# Patient Record
Sex: FEMALE | Race: BLACK OR AFRICAN AMERICAN | Employment: OTHER | URBAN - METROPOLITAN AREA
[De-identification: names, ages, dates, MRNs, and addresses within clinical notes are randomized per-mention and may not be internally consistent; named-entity substitution may affect disease eponyms.]

---

## 2017-01-17 ENCOUNTER — TELEPHONE (OUTPATIENT)
Dept: INTERNAL MEDICINE CLINIC | Facility: CLINIC | Age: 22
End: 2017-01-17

## 2017-01-17 PROBLEM — F84.0 AUTISM (HCC): Status: ACTIVE | Noted: 2017-01-17

## 2017-01-17 PROBLEM — F84.0 AUTISM: Status: ACTIVE | Noted: 2017-01-17

## 2017-01-17 PROBLEM — F90.9 ADHD (ATTENTION DEFICIT HYPERACTIVITY DISORDER): Status: ACTIVE | Noted: 2017-01-17

## 2017-01-17 PROBLEM — F31.9 BIPOLAR 1 DISORDER (HCC): Status: ACTIVE | Noted: 2017-01-17

## 2017-01-17 NOTE — TELEPHONE ENCOUNTER
Signed medical release forms mailed:    [address]   [facility] [address]  Ph# [phone]    Dr [id number]  [place] [id number]   Ph# [phone]

## 2017-01-17 NOTE — PATIENT INSTRUCTIONS
Understanding Bipolar Disorder  Bipolar disorder is a serious disorder of the brain. It may severely disrupt your life. At times, it may cause you and your loved ones great pain. But there is hope.  Although there is no cure, treatment can help control yo In depressive episodes, you feel intense sadness and depression. You may also feel worthless, tired, and helpless. Even the things you value most don’t give you pleasure. At times you may want to die. You may even think about taking your own life.   Date [place] ADHD begins in childhood. Sometimes, your symptoms may improve as you get older. But they also may persist into your adult years. ADHD is often thought of as a “kid’s problem.” That’s why it’s often missed in adults.  In fact, many parents learn they have A

## 2017-01-17 NOTE — PROGRESS NOTES
HPI:    Patient ID: Rach Angela is a 19year old female. HPI She is here to establish care with new physician. She denies any specific complains . According to her mother she was seen by pediatrician until now at THE [facility] .She als HCl 1 MG Oral Tab Take 1 mg by mouth 2 (two) times daily. Disp:  Rfl:    Lithium Carbonate 150 MG Oral Cap Take 150 mg by mouth 2 (two) times daily. Disp:  Rfl:    Lisdexamfetamine Dimesylate (VYVANSE) 40 MG Oral Cap Take 40 mg by mouth every morning.  Disp present. No tracheal tenderness present. No tracheal deviation present. No thyroid mass and no thyromegaly present. Cardiovascular: Normal rate, regular rhythm, normal heart sounds and intact distal pulses. Exam reveals no gallop and no friction rub. Referrals:  None        MP#[id number]

## 2017-01-18 ENCOUNTER — TELEPHONE (OUTPATIENT)
Dept: INTERNAL MEDICINE CLINIC | Facility: CLINIC | Age: 22
End: 2017-01-18

## 2017-01-24 ENCOUNTER — NURSE ONLY (OUTPATIENT)
Dept: INTERNAL MEDICINE CLINIC | Facility: CLINIC | Age: 22
End: 2017-01-24

## 2017-01-24 DIAGNOSIS — E53.8 VITAMIN B12 DEFICIENCY: Primary | ICD-10-CM

## 2017-01-24 PROCEDURE — 96372 THER/PROPH/DIAG INJ SC/IM: CPT | Performed by: INTERNAL MEDICINE

## 2017-01-24 RX ORDER — CYANOCOBALAMIN 1000 UG/ML
1000 INJECTION INTRAMUSCULAR; SUBCUTANEOUS ONCE
Status: COMPLETED | OUTPATIENT
Start: 2017-01-24 | End: 2017-01-24

## 2017-01-24 RX ADMIN — CYANOCOBALAMIN 1000 MCG: 1000 INJECTION INTRAMUSCULAR; SUBCUTANEOUS at 12:28:00

## 2017-01-24 NOTE — PROGRESS NOTES
HPI:    Patient ID: Jonathan Espinosa is a 19year old female. HPI    Review of Systems         Current Outpatient Prescriptions:  ergocalciferol 28270 UNITS Oral Cap Take 1 capsule (50,000 Units total) by mouth once a week.  Disp: 12 capsule Rfl: 0   Gua

## 2017-02-02 ENCOUNTER — NURSE ONLY (OUTPATIENT)
Dept: INTERNAL MEDICINE CLINIC | Facility: CLINIC | Age: 22
End: 2017-02-02

## 2017-02-02 DIAGNOSIS — E53.8 VITAMIN B12 DEFICIENCY: Primary | ICD-10-CM

## 2017-02-02 PROCEDURE — 96372 THER/PROPH/DIAG INJ SC/IM: CPT | Performed by: INTERNAL MEDICINE

## 2017-02-02 RX ORDER — CYANOCOBALAMIN 1000 UG/ML
1000 INJECTION INTRAMUSCULAR; SUBCUTANEOUS ONCE
Status: COMPLETED | OUTPATIENT
Start: 2017-02-02 | End: 2017-02-02

## 2017-02-02 RX ADMIN — CYANOCOBALAMIN 1000 MCG: 1000 INJECTION INTRAMUSCULAR; SUBCUTANEOUS at 10:50:00

## 2017-02-02 NOTE — PROGRESS NOTES
Patient presented into clinic for therapuetic b12 injection. Verified patients identity with two identifiers, and verified b12 order. Administered injection into right deltoid.   Patient tolerated well

## 2017-02-16 ENCOUNTER — NURSE ONLY (OUTPATIENT)
Dept: INTERNAL MEDICINE CLINIC | Facility: CLINIC | Age: 22
End: 2017-02-16

## 2017-02-16 DIAGNOSIS — E53.8 B12 DEFICIENCY: Primary | ICD-10-CM

## 2017-02-16 PROCEDURE — 96372 THER/PROPH/DIAG INJ SC/IM: CPT | Performed by: INTERNAL MEDICINE

## 2017-02-16 RX ORDER — CYANOCOBALAMIN 1000 UG/ML
1000 INJECTION INTRAMUSCULAR; SUBCUTANEOUS ONCE
Status: COMPLETED | OUTPATIENT
Start: 2017-02-16 | End: 2017-02-16

## 2017-02-16 RX ADMIN — CYANOCOBALAMIN 1000 MCG: 1000 INJECTION INTRAMUSCULAR; SUBCUTANEOUS at 12:22:00

## 2017-02-16 NOTE — PROGRESS NOTES
Patient presents to the office for B12 injection. Order verified on lab result note from 1/17/2017. Patient tolerated vaccine well, no reactions. Informed patient next B12 injection will be in one week, verbalized understanding.

## 2017-02-21 ENCOUNTER — OFFICE VISIT (OUTPATIENT)
Dept: INTERNAL MEDICINE CLINIC | Facility: CLINIC | Age: 22
End: 2017-02-21

## 2017-02-21 VITALS
SYSTOLIC BLOOD PRESSURE: 98 MMHG | TEMPERATURE: 99 F | RESPIRATION RATE: 17 BRPM | HEIGHT: 67 IN | DIASTOLIC BLOOD PRESSURE: 63 MMHG | OXYGEN SATURATION: 97 % | BODY MASS INDEX: 21.97 KG/M2 | HEART RATE: 74 BPM | WEIGHT: 140 LBS

## 2017-02-21 DIAGNOSIS — E53.8 VITAMIN B12 DEFICIENCY: Primary | ICD-10-CM

## 2017-02-21 DIAGNOSIS — L30.9 ECZEMA, UNSPECIFIED TYPE: ICD-10-CM

## 2017-02-21 PROCEDURE — 96372 THER/PROPH/DIAG INJ SC/IM: CPT | Performed by: INTERNAL MEDICINE

## 2017-02-21 PROCEDURE — 99213 OFFICE O/P EST LOW 20 MIN: CPT | Performed by: INTERNAL MEDICINE

## 2017-02-21 RX ORDER — CYANOCOBALAMIN 1000 UG/ML
1000 INJECTION INTRAMUSCULAR; SUBCUTANEOUS ONCE
Status: COMPLETED | OUTPATIENT
Start: 2017-02-21 | End: 2017-02-21

## 2017-02-21 RX ADMIN — CYANOCOBALAMIN 1000 MCG: 1000 INJECTION INTRAMUSCULAR; SUBCUTANEOUS at 10:51:00

## 2017-02-21 NOTE — PATIENT INSTRUCTIONS
Atopic Dermatitis (Adult)  Atopic dermatitis is a dry, itchy, red rash. It’s also called eczema. The rash is chronic, or ongoing. It can come and go over time. The disease is often passed down in families.  It causes a problem with the skin barrier that m See your healthcare provider, or as advised. If your symptoms don’t get better or if they get worse in the next 7 days, make an appointment with your healthcare provider.   When to seek medical advice  Call your healthcare provider right away  if any of the · Use sunscreen whenever going out into direct sun. · Use only mild cleansing agents whenever possible. · Wash with mild, nonirritating soap and warm water. · Wear clothing that breathes, such as cotton shirts or canvas shoes.   · If fluid is seeping fro © 7961-4850 [address], [address] [place]. All rights reserved. This information is not intended as a substitute for professional medical care. Always follow your healthcare professional's instructions.

## 2017-02-21 NOTE — PROGRESS NOTES
HPI:    Patient ID: Geri Shaikh is a 19year old female. HPI she came in today complaining of a skin rash.   According to her she was having skin problems ongoing for some time, but for the last few weeks they got worse, she noticed skin discolorati UNITS Oral Cap Take 1 capsule (50,000 Units total) by mouth once a week. Disp: 12 capsule Rfl: 0   divalproex Sodium (DEPAKOTE) 500 MG Oral Tab EC Take 1,000 mg by mouth every morning.  Indications: MOOD Disp:  Rfl:    GuanFACINE HCl 1 MG Oral Tab Take 1 mg frontal sinus tenderness. Left sinus exhibits no maxillary sinus tenderness and no frontal sinus tenderness. Mouth/Throat: Uvula is midline, oropharynx is clear and moist and mucous membranes are normal. Mucous membranes are not cyanotic.  No oropharyngea deficiency  (primary encounter diagnosis)- b12 injection given   Eczema, unspecified type- etiology unclear, patchy discoloration in face and arms, associated with itchiness- will refer to see dermatology , topical cortico for few days for itchiness, if sy

## 2017-04-01 ENCOUNTER — HOSPITAL ENCOUNTER (EMERGENCY)
Facility: HOSPITAL | Age: 22
Discharge: HOME | End: 2017-04-01
Attending: EMERGENCY MEDICINE
Payer: COMMERCIAL

## 2017-04-01 VITALS
OXYGEN SATURATION: 99 % | HEIGHT: 66 IN | DIASTOLIC BLOOD PRESSURE: 43 MMHG | HEART RATE: 90 BPM | TEMPERATURE: 99 F | WEIGHT: 140 LBS | SYSTOLIC BLOOD PRESSURE: 82 MMHG | RESPIRATION RATE: 20 BRPM | BODY MASS INDEX: 22.5 KG/M2

## 2017-04-01 DIAGNOSIS — J02.8 ACUTE BACTERIAL PHARYNGITIS: Primary | ICD-10-CM

## 2017-04-01 DIAGNOSIS — B96.89 ACUTE BACTERIAL PHARYNGITIS: Primary | ICD-10-CM

## 2017-04-01 PROCEDURE — 85025 COMPLETE CBC W/AUTO DIFF WBC: CPT | Performed by: EMERGENCY MEDICINE

## 2017-04-01 PROCEDURE — 96361 HYDRATE IV INFUSION ADD-ON: CPT

## 2017-04-01 PROCEDURE — 81003 URINALYSIS AUTO W/O SCOPE: CPT | Performed by: EMERGENCY MEDICINE

## 2017-04-01 PROCEDURE — 99285 EMERGENCY DEPT VISIT HI MDM: CPT

## 2017-04-01 PROCEDURE — 96365 THER/PROPH/DIAG IV INF INIT: CPT

## 2017-04-01 RX ORDER — CEFUROXIME AXETIL 500 MG/1
500 TABLET ORAL 2 TIMES DAILY
Qty: 20 TABLET | Refills: 0 | Status: SHIPPED | OUTPATIENT
Start: 2017-04-01 | End: 2018-06-26

## 2017-04-01 RX ORDER — ACETAMINOPHEN 325 MG/1
650 TABLET ORAL ONCE
Status: COMPLETED | OUTPATIENT
Start: 2017-04-01 | End: 2017-04-01

## 2017-04-01 RX ORDER — SODIUM CHLORIDE 9 MG/ML
INJECTION, SOLUTION INTRAVENOUS ONCE
Status: COMPLETED | OUTPATIENT
Start: 2017-04-01 | End: 2017-04-01

## 2017-04-01 RX ORDER — DIVALPROEX SODIUM 250 MG/1
250 TABLET, DELAYED RELEASE ORAL 3 TIMES DAILY
COMMUNITY
End: 2020-09-17

## 2017-04-01 RX ORDER — QUETIAPINE 200 MG/1
300 TABLET, FILM COATED ORAL NIGHTLY
COMMUNITY
End: 2020-09-17

## 2017-04-01 NOTE — ED PROVIDER NOTES
Patient Seen in: [facility] AND [facility] Emergency Department    History   Patient presents with:  Headache (neurologic)    Stated Complaint: fever of 103/headache sore throat    HPI    Patient is a 28-year-old female with a history of autism spectrum disorder HENT:   Head: Normocephalic and atraumatic. Eyes: Conjunctivae and EOM are normal. Pupils are equal, round, and reactive to light. Neck: Neck supple. Cardiovascular: Normal rate, regular rhythm and normal heart sounds.     Pulmonary/Chest: Effort no

## 2017-04-04 ENCOUNTER — OFFICE VISIT (OUTPATIENT)
Dept: INTERNAL MEDICINE CLINIC | Facility: CLINIC | Age: 22
End: 2017-04-04

## 2017-04-04 VITALS
HEIGHT: 67 IN | SYSTOLIC BLOOD PRESSURE: 103 MMHG | TEMPERATURE: 99 F | DIASTOLIC BLOOD PRESSURE: 60 MMHG | HEART RATE: 96 BPM | BODY MASS INDEX: 21.66 KG/M2 | WEIGHT: 138 LBS

## 2017-04-04 DIAGNOSIS — R32 URINARY INCONTINENCE, UNSPECIFIED TYPE: ICD-10-CM

## 2017-04-04 DIAGNOSIS — J02.9 PHARYNGITIS, UNSPECIFIED ETIOLOGY: ICD-10-CM

## 2017-04-04 DIAGNOSIS — E53.8 VITAMIN B12 DEFICIENCY: ICD-10-CM

## 2017-04-04 DIAGNOSIS — N39.498: Primary | ICD-10-CM

## 2017-04-04 PROCEDURE — 99214 OFFICE O/P EST MOD 30 MIN: CPT | Performed by: INTERNAL MEDICINE

## 2017-04-04 PROCEDURE — 96372 THER/PROPH/DIAG INJ SC/IM: CPT | Performed by: INTERNAL MEDICINE

## 2017-04-04 PROCEDURE — 99213 OFFICE O/P EST LOW 20 MIN: CPT | Performed by: INTERNAL MEDICINE

## 2017-04-04 PROCEDURE — 81003 URINALYSIS AUTO W/O SCOPE: CPT | Performed by: INTERNAL MEDICINE

## 2017-04-04 RX ORDER — TRIAMCINOLONE ACETONIDE 0.25 MG/G
OINTMENT TOPICAL
Refills: 1 | COMMUNITY
Start: 2017-03-23 | End: 2017-05-02

## 2017-04-04 RX ORDER — CYANOCOBALAMIN 1000 UG/ML
1000 INJECTION INTRAMUSCULAR; SUBCUTANEOUS ONCE
Status: COMPLETED | OUTPATIENT
Start: 2017-04-04 | End: 2017-04-04

## 2017-04-04 RX ADMIN — CYANOCOBALAMIN 1000 MCG: 1000 INJECTION INTRAMUSCULAR; SUBCUTANEOUS at 12:00:00

## 2017-04-04 NOTE — PROGRESS NOTES
HPI:    Patient ID: Ponce Newman is a 19year old female. HPI she came in today for follow-up from ER.   According to her Saturday she was having sore throat headache, fever ,she went to emergency room diagnosed with acute bacterial pharyngitis ,anti easily. Psychiatric/Behavioral: Negative for hallucinations, behavioral problems, confusion, sleep disturbance and agitation. The patient is not nervous/anxious.             Current Outpatient Prescriptions:  Triamcinolone Acetonide 0.025 % External Ointm Grandmother    • Diabetes Maternal Grandfather       Social History:   Smoking Status: Never Smoker                      Alcohol Use: No                 PHYSICAL EXAM:    Physical Exam   Constitutional: She is oriented to person, place, and time.  She appea range of motion. She exhibits no edema or tenderness. Lymphadenopathy:     She has no cervical adenopathy. She has no axillary adenopathy. Neurological: She is alert and oriented to person, place, and time. She has normal reflexes.  No cranial nerve

## 2017-04-04 NOTE — PATIENT INSTRUCTIONS
Treating Incontinence in Women: Special Therapies     During biofeedback, sensors send signals from your pelvic floor muscles to a computer screen. Your doctor will discuss your options for treating your urinary incontinence.  These depend on the caus

## 2017-04-06 ENCOUNTER — TELEPHONE (OUTPATIENT)
Dept: INTERNAL MEDICINE CLINIC | Facility: CLINIC | Age: 22
End: 2017-04-06

## 2017-04-06 NOTE — TELEPHONE ENCOUNTER
Per pt Jarocho Campbell RN left voice message. Informed pt test results. Verbalized understanding. No further questions or concerns at present time.     Notes Recorded by Estrella Jaeger RN on 4/6/2017 at 3:36 PM  Called patient and was routed to voicemail.  Left mes

## 2017-04-11 ENCOUNTER — OFFICE VISIT (OUTPATIENT)
Dept: DERMATOLOGY CLINIC | Facility: CLINIC | Age: 22
End: 2017-04-11

## 2017-04-11 DIAGNOSIS — L30.8 OTHER ECZEMA: Primary | ICD-10-CM

## 2017-04-11 DIAGNOSIS — L20.84 INTRINSIC ATOPIC DERMATITIS: ICD-10-CM

## 2017-04-11 PROCEDURE — 99202 OFFICE O/P NEW SF 15 MIN: CPT | Performed by: DERMATOLOGY

## 2017-04-11 PROCEDURE — 99212 OFFICE O/P EST SF 10 MIN: CPT | Performed by: DERMATOLOGY

## 2017-04-11 NOTE — PROGRESS NOTES
HPI:     Chief Complaint     Rash        HPI     Rash    Additional comments: Patient presents with generalized \"eczema\" since childhood. Using TAC x couple years with improvment. Itching at times. Rough and flaking.         Last edited by Tank Carrasquillo, Surgical History    BACK SURGERY  2008    BACK SURGERY      Comment 2009       Social History   Marital Status: Single  Spouse Name: N/A    Years of Education: N/A  Number of Children: N/A     Occupational History  None on file     Social History Main Topi Orders:  No orders of the defined types were placed in this encounter.          4/11/2017  Piper Irving

## 2017-05-01 ENCOUNTER — TELEPHONE (OUTPATIENT)
Dept: INTERNAL MEDICINE CLINIC | Facility: CLINIC | Age: 22
End: 2017-05-01

## 2017-05-01 NOTE — TELEPHONE ENCOUNTER
Current outpatient prescriptions:   •  Triamcinolone Acetonide 0.025 % External Ointment, MALINDA EXT AA TID, Disp: , Rfl: 1  •

## 2017-05-16 ENCOUNTER — NURSE ONLY (OUTPATIENT)
Dept: INTERNAL MEDICINE CLINIC | Facility: CLINIC | Age: 22
End: 2017-05-16

## 2017-05-16 VITALS
BODY MASS INDEX: 22.6 KG/M2 | RESPIRATION RATE: 18 BRPM | DIASTOLIC BLOOD PRESSURE: 65 MMHG | WEIGHT: 144 LBS | HEART RATE: 97 BPM | HEIGHT: 67 IN | SYSTOLIC BLOOD PRESSURE: 106 MMHG

## 2017-05-16 DIAGNOSIS — E53.8 VITAMIN B12 DEFICIENCY: Primary | ICD-10-CM

## 2017-05-16 PROCEDURE — 96372 THER/PROPH/DIAG INJ SC/IM: CPT | Performed by: INTERNAL MEDICINE

## 2017-05-16 RX ORDER — CYANOCOBALAMIN 1000 UG/ML
1000 INJECTION INTRAMUSCULAR; SUBCUTANEOUS ONCE
Status: COMPLETED | OUTPATIENT
Start: 2017-05-16 | End: 2017-05-16

## 2017-05-16 RX ADMIN — CYANOCOBALAMIN 1000 MCG: 1000 INJECTION INTRAMUSCULAR; SUBCUTANEOUS at 14:15:00

## 2017-05-16 NOTE — PROGRESS NOTES
Patient presented into clinic, per mom \"I am not sure why. \"  When we went over her reasons for being here and mom just requested for patient to get B12 shot. Mom and patient agreed they did did not need to see Dr. Gregory Delarosa.

## 2017-09-05 ENCOUNTER — OFFICE VISIT (OUTPATIENT)
Dept: INTERNAL MEDICINE CLINIC | Facility: CLINIC | Age: 22
End: 2017-09-05

## 2017-09-05 VITALS
WEIGHT: 152 LBS | HEART RATE: 105 BPM | DIASTOLIC BLOOD PRESSURE: 81 MMHG | HEIGHT: 67 IN | BODY MASS INDEX: 23.86 KG/M2 | SYSTOLIC BLOOD PRESSURE: 115 MMHG | TEMPERATURE: 99 F

## 2017-09-05 DIAGNOSIS — J30.2 ACUTE SEASONAL ALLERGIC RHINITIS, UNSPECIFIED TRIGGER: ICD-10-CM

## 2017-09-05 DIAGNOSIS — R14.0 ABDOMINAL BLOATING: Primary | ICD-10-CM

## 2017-09-05 DIAGNOSIS — L20.84 INTRINSIC ATOPIC DERMATITIS: ICD-10-CM

## 2017-09-05 PROBLEM — L20.89 OTHER ATOPIC DERMATITIS: Status: ACTIVE | Noted: 2017-09-05

## 2017-09-05 PROCEDURE — 99212 OFFICE O/P EST SF 10 MIN: CPT | Performed by: INTERNAL MEDICINE

## 2017-09-05 PROCEDURE — 99214 OFFICE O/P EST MOD 30 MIN: CPT | Performed by: INTERNAL MEDICINE

## 2017-09-05 RX ORDER — FLUTICASONE PROPIONATE 50 MCG
2 SPRAY, SUSPENSION (ML) NASAL DAILY
Qty: 1 BOTTLE | Refills: 1 | Status: SHIPPED | OUTPATIENT
Start: 2017-09-05 | End: 2018-06-26

## 2017-09-05 RX ORDER — LORATADINE 10 MG/1
10 TABLET ORAL DAILY
Qty: 30 TABLET | Refills: 0 | Status: SHIPPED | OUTPATIENT
Start: 2017-09-05 | End: 2018-06-26

## 2017-09-05 NOTE — PATIENT INSTRUCTIONS
Abdominal bloating  (primary encounter diagnosis) etiology not  Clear, not regular bowel movement advised her to take high-fiber to avoid constipation, drink plenty of fluids will check for H pylori, order ultrasound of the abdomen and pelvis will follow-u · Treat any skin infection as directed. · Use oral diphenhydramine to help reduce itching. This is an antihistamine you can buy at drug and grocery stores. It can make you sleepy, so use lower doses during the daytime. Or you can use loratadine.  This is a For best results, be prepared to answer questions about your medical history, including the following:  · Previous abdominal surgery  · Previous abdominal imaging tests, including ultrasound, CT, or MRI studies   During your test  · You may be asked to put The health care professional doing the Ponce ask why your doctor has ordered the test. He or she may also ask when your last period started.  Also let him or her know:  · If Gorman Goltz had an ultrasound exam of this area before  · If you’ve had any pel

## 2017-09-05 NOTE — PROGRESS NOTES
HPI:    Patient ID: Enrique Jordan is a 23year old female. HPI she came in today complaining of abdominal bloating and follow-up on her rash.   She states that her rash on her skin progressively got worse despite using triamcinolone is not getting bet agitation, behavioral problems, confusion, hallucinations and sleep disturbance. The patient is not nervous/anxious. Current Outpatient Prescriptions:  Fluticasone Propionate 50 MCG/ACT Nasal Suspension 2 sprays by Each Nare route daily.  Disp: 1 Tympanic membrane and external ear normal. No drainage or tenderness. No mastoid tenderness. Nose: Nose normal. Right sinus exhibits no maxillary sinus tenderness and no frontal sinus tenderness.  Left sinus exhibits no maxillary sinus tenderness and no f normal.   Vitals reviewed.            ASSESSMENT/PLAN:   Abdominal bloating  (primary encounter diagnosis) etiology not  Clear, not regular bowel movement advised her to take high-fiber to avoid constipation, drink plenty of fluids will check for H pylori,

## 2018-02-01 ENCOUNTER — TELEPHONE (OUTPATIENT)
Dept: INTERNAL MEDICINE CLINIC | Facility: CLINIC | Age: 23
End: 2018-02-01

## 2018-03-01 NOTE — TELEPHONE ENCOUNTER
Spoke with mother Christelle, informed PCP unable to refill medication since psychiatrist does the refills. Verbalized understanding, stated she will call them.

## 2018-03-01 NOTE — TELEPHONE ENCOUNTER
Received call from mother, requesting refill request for vyvanse 40. Request was approved 2/1/18 and script was printed but per mother nobody called her to  script.  Last office visit 9/5/17 for abdominal bloating and last office visit for ADHD and b

## 2018-05-29 ENCOUNTER — TELEPHONE (OUTPATIENT)
Dept: INTERNAL MEDICINE CLINIC | Facility: CLINIC | Age: 23
End: 2018-05-29

## 2018-05-29 NOTE — TELEPHONE ENCOUNTER
Current Outpatient Prescriptions:     •  Triamcinolone Acetonide 0.025 % External Ointment, MALINDA EXT AA TID, Disp: 80 g, Rfl: 1 REFILL

## 2019-04-02 ENCOUNTER — HOSPITAL ENCOUNTER (EMERGENCY)
Facility: HOSPITAL | Age: 24
Discharge: HOME | End: 2019-04-02
Attending: PHYSICIAN ASSISTANT
Payer: COMMERCIAL

## 2019-04-02 ENCOUNTER — TELEPHONE (OUTPATIENT)
Dept: INTERNAL MEDICINE CLINIC | Facility: CLINIC | Age: 24
End: 2019-04-02

## 2019-04-02 ENCOUNTER — APPOINTMENT (OUTPATIENT)
Dept: CT IMAGING | Facility: HOSPITAL | Age: 24
End: 2019-04-02
Attending: PHYSICIAN ASSISTANT
Payer: COMMERCIAL

## 2019-04-02 VITALS
DIASTOLIC BLOOD PRESSURE: 74 MMHG | HEART RATE: 76 BPM | BODY MASS INDEX: 25.11 KG/M2 | OXYGEN SATURATION: 98 % | SYSTOLIC BLOOD PRESSURE: 116 MMHG | RESPIRATION RATE: 16 BRPM | TEMPERATURE: 98 F | WEIGHT: 160 LBS | HEIGHT: 67 IN

## 2019-04-02 DIAGNOSIS — R51.9 ACUTE NONINTRACTABLE HEADACHE, UNSPECIFIED HEADACHE TYPE: Primary | ICD-10-CM

## 2019-04-02 DIAGNOSIS — D64.9 ANEMIA, UNSPECIFIED TYPE: ICD-10-CM

## 2019-04-02 PROCEDURE — 99284 EMERGENCY DEPT VISIT MOD MDM: CPT

## 2019-04-02 PROCEDURE — 85025 COMPLETE CBC W/AUTO DIFF WBC: CPT | Performed by: PHYSICIAN ASSISTANT

## 2019-04-02 PROCEDURE — 96360 HYDRATION IV INFUSION INIT: CPT

## 2019-04-02 PROCEDURE — 80048 BASIC METABOLIC PNL TOTAL CA: CPT | Performed by: PHYSICIAN ASSISTANT

## 2019-04-02 PROCEDURE — 70450 CT HEAD/BRAIN W/O DYE: CPT | Performed by: PHYSICIAN ASSISTANT

## 2019-04-02 RX ORDER — IBUPROFEN 600 MG/1
TABLET ORAL
Qty: 20 TABLET | Refills: 0 | Status: SHIPPED | OUTPATIENT
Start: 2019-04-02

## 2019-04-02 RX ORDER — BUTALBITAL, ACETAMINOPHEN AND CAFFEINE 50; 325; 40 MG/1; MG/1; MG/1
1-2 TABLET ORAL EVERY 6 HOURS PRN
Qty: 14 TABLET | Refills: 0 | Status: SHIPPED | OUTPATIENT
Start: 2019-04-02 | End: 2019-04-09

## 2019-04-02 NOTE — TELEPHONE ENCOUNTER
Patient mom calling patient c/o sharp pain back of head that us constant sometimes worse than others symptoms started at least 2 weeks ago. Last seen 2017.     Sending to RN in office to call family member

## 2019-04-02 NOTE — ED PROVIDER NOTES
Patient Seen in: [facility] AND [facility] Emergency Department    History   Patient presents with:  Migraine    Stated Complaint: Migraines intermittently for a couple weeks    HPI      70-year-old female presents with chief complaint of posterior headache.   On Tobacco Use      Smoking status: Never Smoker      Smokeless tobacco: Never Used    Alcohol use: No    Drug use: No      Review of Systems    Positive for stated complaint: Migraines intermittently for a couple weeks  Other systems are as noted in HPI.   Co obvious deformity. Neurological: Cranial nerve II through XII intact. DTRs intact and symmetrical bilaterally. Bowel function is intact. Sensation intact to light touch. Strength and range of motion symmetrical of all extremities x4.  Patient exhibits norm encounter diagnosis)  Anemia, unspecified type    Disposition:  Discharge    Follow-up:  Mila Gonzalez MD  [address] [place] [address] [phone]    Schedule an appointment as soon as possible for a visit in 2 days  For follow-up    Anastasia Ramírez

## 2019-04-02 NOTE — TELEPHONE ENCOUNTER
Mom calling stating patient has had sharp stabbing pain in the back of her head daily on and off for two weeks, pain 8/10 when it happens. Hasn't been seen since 2017. Advised she present to the ER for further evaluation.

## 2019-12-02 ENCOUNTER — NURSE TRIAGE (OUTPATIENT)
Dept: INTERNAL MEDICINE CLINIC | Facility: CLINIC | Age: 24
End: 2019-12-02

## 2019-12-02 ENCOUNTER — HOSPITAL ENCOUNTER (EMERGENCY)
Facility: HOSPITAL | Age: 24
Discharge: LEFT BEFORE DISCHARGE | End: 2019-12-02
Payer: MEDICAID

## 2019-12-02 VITALS
WEIGHT: 160.94 LBS | TEMPERATURE: 99 F | HEIGHT: 67 IN | BODY MASS INDEX: 25.26 KG/M2 | DIASTOLIC BLOOD PRESSURE: 63 MMHG | RESPIRATION RATE: 18 BRPM | SYSTOLIC BLOOD PRESSURE: 93 MMHG | HEART RATE: 76 BPM | OXYGEN SATURATION: 100 %

## 2019-12-02 NOTE — TELEPHONE ENCOUNTER
Action Requested: Summary for Provider     []  Critical Lab, Recommendations Needed  [x] Need Additional Advice  [x]   FYI    []   Need Orders  [] Need Medications Sent to Pharmacy  []  Other     SUMMARY: patient's mother called in today (on ALEXIS) as marissa

## 2019-12-02 NOTE — TELEPHONE ENCOUNTER
Called mom back to inform her of the note below.  Left a detailed message on her cell (ok per ALEXIS)

## 2019-12-02 NOTE — TELEPHONE ENCOUNTER
Mother of patient calling states that her daughter hasn't had a bowel movement in 30 days. Transferring to triage.

## 2019-12-03 ENCOUNTER — HOSPITAL ENCOUNTER (EMERGENCY)
Facility: HOSPITAL | Age: 24
Discharge: HOME | End: 2019-12-03
Attending: EMERGENCY MEDICINE
Payer: MEDICAID

## 2019-12-03 ENCOUNTER — APPOINTMENT (OUTPATIENT)
Dept: GENERAL RADIOLOGY | Facility: HOSPITAL | Age: 24
End: 2019-12-03
Attending: EMERGENCY MEDICINE
Payer: MEDICAID

## 2019-12-03 VITALS
WEIGHT: 140 LBS | RESPIRATION RATE: 18 BRPM | SYSTOLIC BLOOD PRESSURE: 109 MMHG | OXYGEN SATURATION: 99 % | TEMPERATURE: 99 F | HEIGHT: 67 IN | BODY MASS INDEX: 21.97 KG/M2 | HEART RATE: 77 BPM | DIASTOLIC BLOOD PRESSURE: 63 MMHG

## 2019-12-03 DIAGNOSIS — K59.00 CONSTIPATION, UNSPECIFIED CONSTIPATION TYPE: Primary | ICD-10-CM

## 2019-12-03 PROCEDURE — 99284 EMERGENCY DEPT VISIT MOD MDM: CPT

## 2019-12-03 PROCEDURE — 74018 RADEX ABDOMEN 1 VIEW: CPT | Performed by: EMERGENCY MEDICINE

## 2019-12-03 PROCEDURE — 81025 URINE PREGNANCY TEST: CPT

## 2019-12-03 RX ORDER — MAGNESIUM CARB/ALUMINUM HYDROX 105-160MG
296 TABLET,CHEWABLE ORAL ONCE
Status: COMPLETED | OUTPATIENT
Start: 2019-12-03 | End: 2019-12-03

## 2019-12-03 NOTE — TELEPHONE ENCOUNTER
ER f/u 12/4     Pt mother was called and informed of Segun Moore message below and she verbalized understanding. She stated that she will take her to THE [facility] ER today.

## 2019-12-03 NOTE — TELEPHONE ENCOUNTER
The patient went to the ED and     LWBS before Assessment     Per the mother who is on HIPPA; The patient is currently at work. She left the ED because the wait time was over 3.5 hours.     The mother stated she did give her a 3 ducolax tablets and she d

## 2019-12-04 ENCOUNTER — HOSPITAL ENCOUNTER (OUTPATIENT)
Facility: HOSPITAL | Age: 24
Setting detail: OBSERVATION
Discharge: HOME | End: 2019-12-06
Attending: EMERGENCY MEDICINE | Admitting: INTERNAL MEDICINE
Payer: MEDICAID

## 2019-12-04 DIAGNOSIS — K59.00 CONSTIPATION, UNSPECIFIED CONSTIPATION TYPE: Primary | ICD-10-CM

## 2019-12-04 DIAGNOSIS — K59.00 OBSTIPATION: ICD-10-CM

## 2019-12-04 PROCEDURE — 99243 OFF/OP CNSLTJ NEW/EST LOW 30: CPT | Performed by: INTERNAL MEDICINE

## 2019-12-04 RX ORDER — GUANFACINE 1 MG/1
1 TABLET ORAL EVERY EVENING
Status: DISCONTINUED | OUTPATIENT
Start: 2019-12-04 | End: 2019-12-04

## 2019-12-04 RX ORDER — QUETIAPINE 100 MG/1
100 TABLET, FILM COATED ORAL DAILY
COMMUNITY
End: 2020-01-13 | Stop reason: ALTCHOICE

## 2019-12-04 RX ORDER — SODIUM CHLORIDE 9 MG/ML
INJECTION, SOLUTION INTRAVENOUS CONTINUOUS
Status: DISCONTINUED | OUTPATIENT
Start: 2019-12-04 | End: 2019-12-06

## 2019-12-04 RX ORDER — DIVALPROEX SODIUM 250 MG/1
250 TABLET, DELAYED RELEASE ORAL 3 TIMES DAILY
Status: DISCONTINUED | OUTPATIENT
Start: 2019-12-04 | End: 2019-12-06

## 2019-12-04 RX ORDER — QUETIAPINE 100 MG/1
100 TABLET, FILM COATED ORAL DAILY
Status: DISCONTINUED | OUTPATIENT
Start: 2019-12-05 | End: 2019-12-06

## 2019-12-04 RX ORDER — GUANFACINE 1 MG/1
2 TABLET ORAL DAILY
Status: DISCONTINUED | OUTPATIENT
Start: 2019-12-05 | End: 2019-12-06

## 2019-12-04 RX ORDER — GUANFACINE 1 MG/1
1 TABLET ORAL EVERY EVENING
COMMUNITY
End: 2020-01-13

## 2019-12-04 NOTE — ED PROVIDER NOTES
Patient Seen in: [facility] AND [facility] Emergency Department      History   Patient presents with:  Abdomen/Flank Pain    Stated Complaint: constipation    HPI    29 y/o female with history listed below and history of constipation here was seen ongoing for al distention, no significant pain or guarding. Patient would not tolerate rectal exam and try to get off the bed but I did. No obvious bleeding. Musculoskeletal: Normal range of motion. No edema or tenderness.    Neurological: Alert and oriented to person, is diffuse gaseous distension of the colon which appears dilated with a extensive degree of retained feces along the descending and rectosigmoid colon. Please correlate clinically for pseudo-obstruction and fecal impaction.  CALCIFICATIONS:  None significa

## 2019-12-04 NOTE — ED NOTES
Distended abdomen noted. Recently at [facility] and had xray. Possible bowel obstruction. Patient had small BM after laxatives.   Denies nausea/ vomiting

## 2019-12-04 NOTE — ED NOTES
Orders for admission, patient is aware of plan, and ready to go upstairs. Any questions, please call ED RN [phone] [facility].

## 2019-12-04 NOTE — TELEPHONE ENCOUNTER
Mom who is on HIPPA consent advised of recommendations, she will bring daughter back to ER, she had questions about her insurance pending status and coverage, I advised she discuss with ER registration.       She also stated her daughter told her she had he

## 2019-12-04 NOTE — TELEPHONE ENCOUNTER
Pt was seen in ER. Mom states pt was given option of having enema vs drinking bottle of Magnesium Citrate.  Mom opted to give pt a bottle of Magnesium Citrate, started drinking the med when seen in ER and completed med about 1 AM today after discharged from

## 2019-12-04 NOTE — ED PROVIDER NOTES
Patient Seen in: [facility] Emergency Department      History   Patient presents with:  Constipation    Stated Complaint: constipation x 30 days    HPI    Patient is a 28-year-old female with a history of autism and bipolar disorder presenting with Rhythm: Normal rate and regular rhythm. Heart sounds: Normal heart sounds. Pulmonary:      Effort: Pulmonary effort is normal.      Breath sounds: Normal breath sounds.    Abdominal:      General: Bowel sounds are normal.      Palpations: Abdomen is would like to try mag citrate at home.         Disposition and Plan     Clinical Impression:  Constipation, unspecified constipation type  (primary encounter diagnosis)    Disposition:  Discharge  12/3/2019 10:45 pm    Follow-up:  Bianka Campbell MD  [address]

## 2019-12-04 NOTE — ED INITIAL ASSESSMENT (HPI)
Went to THE [facility] on Tuesday. Possible bowel obstruction.

## 2019-12-05 ENCOUNTER — ANESTHESIA EVENT (OUTPATIENT)
Dept: ENDOSCOPY | Facility: HOSPITAL | Age: 24
End: 2019-12-05
Payer: MEDICAID

## 2019-12-05 ENCOUNTER — ANESTHESIA (OUTPATIENT)
Dept: ENDOSCOPY | Facility: HOSPITAL | Age: 24
End: 2019-12-05
Payer: MEDICAID

## 2019-12-05 PROCEDURE — 99219 INITIAL OBSERVATION CARE,LEVL II: CPT | Performed by: INTERNAL MEDICINE

## 2019-12-05 PROCEDURE — 45330 DIAGNOSTIC SIGMOIDOSCOPY: CPT | Performed by: INTERNAL MEDICINE

## 2019-12-05 PROCEDURE — 0DJD8ZZ INSPECTION OF LOWER INTESTINAL TRACT, VIA NATURAL OR ARTIFICIAL OPENING ENDOSCOPIC: ICD-10-PCS | Performed by: INTERNAL MEDICINE

## 2019-12-05 RX ORDER — NALOXONE HYDROCHLORIDE 0.4 MG/ML
80 INJECTION, SOLUTION INTRAMUSCULAR; INTRAVENOUS; SUBCUTANEOUS AS NEEDED
Status: DISCONTINUED | OUTPATIENT
Start: 2019-12-05 | End: 2019-12-05 | Stop reason: HOSPADM

## 2019-12-05 RX ORDER — SODIUM CHLORIDE, SODIUM LACTATE, POTASSIUM CHLORIDE, CALCIUM CHLORIDE 600; 310; 30; 20 MG/100ML; MG/100ML; MG/100ML; MG/100ML
INJECTION, SOLUTION INTRAVENOUS CONTINUOUS
Status: CANCELLED | OUTPATIENT
Start: 2019-12-05

## 2019-12-05 RX ORDER — IPRATROPIUM BROMIDE AND ALBUTEROL SULFATE 2.5; .5 MG/3ML; MG/3ML
3 SOLUTION RESPIRATORY (INHALATION) EVERY 6 HOURS PRN
Status: DISCONTINUED | OUTPATIENT
Start: 2019-12-05 | End: 2019-12-06

## 2019-12-05 RX ORDER — LIDOCAINE HYDROCHLORIDE 10 MG/ML
INJECTION, SOLUTION EPIDURAL; INFILTRATION; INTRACAUDAL; PERINEURAL AS NEEDED
Status: DISCONTINUED | OUTPATIENT
Start: 2019-12-05 | End: 2019-12-05 | Stop reason: SURG

## 2019-12-05 RX ORDER — IPRATROPIUM BROMIDE AND ALBUTEROL SULFATE 2.5; .5 MG/3ML; MG/3ML
SOLUTION RESPIRATORY (INHALATION)
Status: DISPENSED
Start: 2019-12-05 | End: 2019-12-05

## 2019-12-05 RX ORDER — 0.9 % SODIUM CHLORIDE 0.9 %
VIAL (ML) INJECTION
Status: DISPENSED
Start: 2019-12-05 | End: 2019-12-06

## 2019-12-05 RX ORDER — SODIUM CHLORIDE, SODIUM LACTATE, POTASSIUM CHLORIDE, CALCIUM CHLORIDE 600; 310; 30; 20 MG/100ML; MG/100ML; MG/100ML; MG/100ML
INJECTION, SOLUTION INTRAVENOUS CONTINUOUS
Status: DISCONTINUED | OUTPATIENT
Start: 2019-12-05 | End: 2019-12-06

## 2019-12-05 RX ORDER — NALOXONE HYDROCHLORIDE 0.4 MG/ML
80 INJECTION, SOLUTION INTRAMUSCULAR; INTRAVENOUS; SUBCUTANEOUS AS NEEDED
Status: CANCELLED | OUTPATIENT
Start: 2019-12-05 | End: 2019-12-05

## 2019-12-05 RX ADMIN — SODIUM CHLORIDE: 9 INJECTION, SOLUTION INTRAVENOUS at 12:24:00

## 2019-12-05 RX ADMIN — LIDOCAINE HYDROCHLORIDE 50 MG: 10 INJECTION, SOLUTION EPIDURAL; INFILTRATION; INTRACAUDAL; PERINEURAL at 12:05:00

## 2019-12-05 NOTE — OPERATIVE REPORT
E[facility]EN [facility] Endoscopy Report      Preoperative Diagnosis:  - constipation /obstipation with fecal impaction      Postoperative Diagnosis:  - disimpaction under sedation  - flex sigmoidoscopy to rectum with flushing      Procedure:    Flexibl

## 2019-12-05 NOTE — ANESTHESIA POSTPROCEDURE EVALUATION
Patient: Stephanie Rangel    Procedure Summary     Date:  12/05/19 Room / Location:  [facility] ENDOSCOPY 04 / [facility] ENDOSCOPY    Anesthesia Start:  0418 Anesthesia Stop:      Procedure:  FLEXIBLE SIGMOIDOSCOPY (N/A ) Diagnosis:       Fecal impaction ([address]      (di

## 2019-12-05 NOTE — CONSULTS
[facility] AND [facility]  Report of Consultation    Verterry Tollhouse Patient Status:  Observation    5/10/1995 MRN [id number]   Location [facility] 3W/SW Attending Juan Springer MD   Hosp Day # 0 PCP Rene Rabago MD     Reason for Consultation:  - c clinically for pseudo-obstruction and fecal impaction.       History:  Past Medical History:   Diagnosis Date   • ADHD (attention deficit hyperactivity disorder)    • Autism    • Autistic spectrum disorder    • Bipolar 1 disorder (HCC)    • Migraines      P a flexible sigmoidoscopy/disimpaction under sedation for patient tolerance. The risks and benefits of the procedure itself were discussed at length with the patient's mother.   She is agreeable to proceeding and she feels that this would be the best course

## 2019-12-05 NOTE — TELEPHONE ENCOUNTER
Patient went to [address] [place] [address] yesterday and was admitted.      Impression and Plan:  Patient Active Problem List:     Bipolar 1 disorder ([facility] [address]     Autism     ADHD (attention deficit hyperactivity disorder)     Eczema     Pharyngitis     Incontinence of urine     I

## 2019-12-05 NOTE — PLAN OF CARE
Problem: Patient/Family Goals  Goal: Patient/Family Long Term Goal  Description  Patient's Long Term Goal: to be discharged    Interventions:  - SW to assist with d/c planning  - GI on consult  - clear liquid diet  - See additional Care Plan goals for sp

## 2019-12-05 NOTE — ANESTHESIA PREPROCEDURE EVALUATION
Anesthesia PreOp Note    HPI:     Daja Sanchez is a 30year old female who presents for preoperative consultation requested by: Tamar Montoya MD    Date of Surgery: 12/4/2019 - 12/5/2019    Procedure(s):   FLEXIBLE SIGMOIDOSCOPY  Indication: fecal i mouth 3 (three) times daily. , Disp: , Rfl: , Taking  QUEtiapine Fumarate 200 MG Oral Tab, Take 300 mg by mouth nightly.  , Disp: , Rfl: , Taking  triamcinolone acetonide 0.1 % External Cream, Apply to the aa of the extremities bid, Disp: 453.5 g, Rfl: 1  G Smoking status: Never Smoker      Smokeless tobacco: Never Used    Substance and Sexual Activity      Alcohol use: No      Drug use: No      Sexual activity: Not on file    Lifestyle      Physical activity:        Days per week: Not on file        Minutes temperature is 98 °F (36.7 °C). Her blood pressure is 105/74 and her pulse is 83. Her respiration is 12 and oxygen saturation is 93%.     12/04/19  1942 12/05/19  0442 12/05/19  0814 12/05/19  1124   BP: 103/60 106/63 99/56 105/74   Pulse: 90 90 87 83   Res

## 2019-12-05 NOTE — PLAN OF CARE
Problem: Patient/Family Goals  Goal: Patient/Family Long Term Goal  Description  Patient's Long Term Goal: to be discharged    Interventions:  - SW to assist with d/c planning  - GI on consult  - clear liquid diet  - See additional Care Plan goals for sp 12/5/2019 1525 by Hever Hart RN  Outcome: Progressing  12/5/2019 1525 by Hever Hart RN  Outcome: Progressing     Problem: PAIN - ADULT  Goal: Verbalizes/displays adequate comfort level or patient's stated pain goal  Description  INTERVENTIONS:  - Enc Consider post-discharge preferences of patient/family/discharge partner  - Complete POLST form as appropriate  - Assess patient's ability to be responsible for managing their own health  - Refer to Case Management Department for coordinating discharge plan

## 2019-12-05 NOTE — H&P
[facility]    History and Physical    Rach Angela Patient Status:  Observation    5/10/1995 MRN [id number]   Location [facility] 3W/SW Attending Suzanna Escalante MD   Hosp Day # 0 PCP Agustina Joseph MD     Date:  2019  Da daily.  QUEtiapine Fumarate 200 MG Oral Tab, Take 300 mg by mouth nightly. triamcinolone acetonide 0.1 % External Cream, Apply to the aa of the extremities bid  GuanFACINE HCl 1 MG Oral Tab, Take 1 mg by mouth 2 (two) times daily.  2mg in AM and 1mg in P reveals no gallop and no friction rub. No murmur heard. Pulmonary/Chest: Effort normal and breath sounds normal. No respiratory distress. She has no wheezes. She has no rales. She exhibits no tenderness. Abdominal: Soft.  Bowel sounds are normal. She

## 2019-12-06 VITALS
HEIGHT: 67 IN | DIASTOLIC BLOOD PRESSURE: 74 MMHG | BODY MASS INDEX: 21.97 KG/M2 | HEART RATE: 82 BPM | TEMPERATURE: 98 F | OXYGEN SATURATION: 97 % | RESPIRATION RATE: 16 BRPM | WEIGHT: 140 LBS | SYSTOLIC BLOOD PRESSURE: 113 MMHG

## 2019-12-06 PROCEDURE — 99217 OBSERVATION CARE DISCHARGE: CPT | Performed by: INTERNAL MEDICINE

## 2019-12-06 PROCEDURE — 99213 OFFICE O/P EST LOW 20 MIN: CPT | Performed by: PHYSICIAN ASSISTANT

## 2019-12-06 RX ORDER — POLYETHYLENE GLYCOL 3350 17 G/17G
17 POWDER, FOR SOLUTION ORAL DAILY
Qty: 30 EACH | Refills: 0 | Status: SHIPPED | OUTPATIENT
Start: 2019-12-06 | End: 2021-02-22

## 2019-12-06 NOTE — PLAN OF CARE
Problem: Patient/Family Goals  Goal: Patient/Family Long Term Goal  Description  Patient's Long Term Goal: to be discharged    Interventions:  - SW to assist with d/c planning  - GI on consult  - clear liquid diet  - See additional Care Plan goals for sp based on type and severity of pain and evaluate response  - Implement non-pharmacological measures as appropriate and evaluate response  - Consider cultural and social influences on pain and pain management  - Manage/alleviate anxiety  - Utilize distractio status, cognitive ability or social support system  Outcome: Progressing     Problem: Altered Communication/Language Barrier  Goal: Patient/Family is able to understand and participate in their care  Description  Interventions:  - Assess communication abil

## 2019-12-06 NOTE — DISCHARGE SUMMARY
DISCHARGE SUMMARY     Hitesh Griggs Patient Status:  Observation    5/10/1995 MRN [id number]   Location [facility] 3W/SW Attending Mitra Hernandez MD   Hosp Day # 0 PCP Cecilia Peacock MD     Date of Admission: 2019  Date of Discharge:  Neurological: She is alert and oriented to person, place, and time. She displays normal reflexes. No cranial nerve deficit, sensory deficit or motor deficit. Coordination normal.   Skin: Skin is warm and dry.    Psychiatric: She has a normal mood and affe extremities bid    !! GuanFACINE HCl 1 MG Oral Tab  Take 1 mg by mouth 2 (two) times daily. 2mg in AM and 1mg in PM 1700     ! ! - Potential duplicate medications found. Please discuss with provider.               Discharge Diet: General diet    Discharge Ac

## 2019-12-06 NOTE — PLAN OF CARE
Pt alert. Reports no pain. Abdomen soft. Bowel sounds present x4. VSS. No acute distress noted.   Problem: Patient/Family Goals  Goal: Patient/Family Long Term Goal  Description  Patient's Long Term Goal: to be discharged    Interventions:  - SW to ass pain and request assistance  - Assess pain using appropriate pain scale  - Administer analgesics based on type and severity of pain and evaluate response  - Implement non-pharmacological measures as appropriate and evaluate response  - Consider cultural an needs post-hospital services based on physician/LIP order or complex needs related to functional status, cognitive ability or social support system  Outcome: Progressing     Problem: Altered Communication/Language Barrier  Goal: Patient/Family is able to u

## 2019-12-06 NOTE — PROGRESS NOTES
[address]     Gastroenterology Progress Note    Elly Gunnels Patient Status:  Observation    5/10/1995 MRN [id number]   Location [facility] 3W/SW Attending Chana Krishna MD   Hosp Day # 0 PCP Kyrie Grimaldo MD mother over the phone, at the patient's request.    [facility]    Results:     Lab Results   Component Value Date    WBC 6.1 12/05/2019    HGB 10.3 (L) 12/05/2019    HCT 33.6 (L) 12/05/2019    .0 12/05/2019

## 2019-12-17 ENCOUNTER — TELEPHONE (OUTPATIENT)
Dept: GASTROENTEROLOGY | Facility: CLINIC | Age: 24
End: 2019-12-17

## 2019-12-17 NOTE — TELEPHONE ENCOUNTER
Pt's mom contacted, given directions below. Will call back on Thursday with update. Aware if symptoms worsen to go to ER. States understanding of all instructions.

## 2019-12-17 NOTE — TELEPHONE ENCOUNTER
RN to contact pts mother  - treatment of her constipation will require bowel regimen  - get the specifics of what she taking now and then can make adjustments based on that  - will need follow up in the office with Annabella Garcia

## 2019-12-17 NOTE — TELEPHONE ENCOUNTER
Dr Enriqueta Espinosa, mom states:  Miralax daily  Took 3 dulcolax tabs Sunday with no results-not used regularly  Took Sennakot Saturday with no results-not used regularly. Willing to try stool softener I mentioned below.     Please advise bowel regimen, if able to do

## 2019-12-17 NOTE — TELEPHONE ENCOUNTER
Dr Derick Jett, transferred from phone room--chronically has constipation, but last stool was 12/6, day after her flex sig. I spoke to mom (have ALEXIS). Pt denies pain.  Has tried daily Miralax, gave Dulcolax tablet, has increased water to about 32 oz daily, but no

## 2019-12-17 NOTE — TELEPHONE ENCOUNTER
Start on -   - Miralax twice daily   - take dulcolax daily x 1 tablet in am  - call with an update in 48 hours, may need enema again and if severe may need ER

## 2019-12-19 NOTE — TELEPHONE ENCOUNTER
Mom contacted, given message below. I reviewed instructions for giving a Fleets enema. She will do it tonight and contact us tomorrow with results.

## 2019-12-19 NOTE — TELEPHONE ENCOUNTER
Dr Aiden Le, mom, contacted. States still no results/stools since Dec 7. She asked pt if she had any pain, and this was denied. Please advise. Thanks.

## 2019-12-20 NOTE — TELEPHONE ENCOUNTER
RN to contact, please have them try one more enema tonight. Continue on Miralax and dulcolax and make sure she is getting enough fluid in diet ( drinking water ) as the hard stools suggest this may be contributing.    If severe symptoms then ER

## 2019-12-20 NOTE — TELEPHONE ENCOUNTER
Called Christelle to check on status of the patient  Performed 3 way call with the patient. Fleets enema was administered 3-4 hours ago  Patient had one, small, hard pebble like stool. Very small and she had pain passing it. Abdomen is bloated.  Feels

## 2019-12-20 NOTE — TELEPHONE ENCOUNTER
Called the patient's mother and informed her of Dr. Blake Benavidez instructions below. She verbalized her understanding of all instructions. Will present to ER over the weekend of symptoms become severe. GI RN please follow up on Monday to evaluate status.

## 2019-12-23 NOTE — TELEPHONE ENCOUNTER
Called patient's mother for update. She states patient had left the house for a few days before she got home Friday night so she did not administer the second fleets enema until last night. She just had a bowel movement this morning.  Christelle states it wa

## 2019-12-24 NOTE — TELEPHONE ENCOUNTER
[id number] Shirley Pradhan noted, some response to the above lax   - continue on current medications  - repeat fleets enema if not becoming more regular

## 2019-12-24 NOTE — TELEPHONE ENCOUNTER
Called patient's mother and informed her of instructions below. She verbalized her understanding and had no further questions at this time.

## 2020-01-13 ENCOUNTER — OFFICE VISIT (OUTPATIENT)
Dept: FAMILY MEDICINE CLINIC | Facility: CLINIC | Age: 25
End: 2020-01-13
Payer: MEDICAID

## 2020-01-13 ENCOUNTER — NURSE TRIAGE (OUTPATIENT)
Dept: OTHER | Age: 25
End: 2020-01-13

## 2020-01-13 VITALS
SYSTOLIC BLOOD PRESSURE: 100 MMHG | TEMPERATURE: 99 F | HEIGHT: 68 IN | OXYGEN SATURATION: 97 % | WEIGHT: 155 LBS | BODY MASS INDEX: 23.49 KG/M2 | RESPIRATION RATE: 20 BRPM | HEART RATE: 116 BPM | DIASTOLIC BLOOD PRESSURE: 72 MMHG

## 2020-01-13 DIAGNOSIS — R68.89 FLU-LIKE SYMPTOMS: Primary | ICD-10-CM

## 2020-01-13 DIAGNOSIS — J10.1 INFLUENZA A: ICD-10-CM

## 2020-01-13 LAB
POCT INFLUENZA A: POSITIVE
POCT INFLUENZA B: NEGATIVE

## 2020-01-13 PROCEDURE — 99202 OFFICE O/P NEW SF 15 MIN: CPT | Performed by: NURSE PRACTITIONER

## 2020-01-13 PROCEDURE — 87502 INFLUENZA DNA AMP PROBE: CPT | Performed by: NURSE PRACTITIONER

## 2020-01-13 RX ORDER — QUETIAPINE 100 MG/1
TABLET, FILM COATED ORAL
COMMUNITY
End: 2020-09-17

## 2020-01-13 NOTE — TELEPHONE ENCOUNTER
Action Requested: Summary for Provider     []  Critical Lab, Recommendations Needed  [] Need Additional Advice  [x]   FYI    []   Need Orders  [] Need Medications Sent to Pharmacy  []  Other     SUMMARY: Mom called indicated that patient has had a cough, b

## 2020-01-13 NOTE — PROGRESS NOTES
CHIEF COMPLAINT:   Patient presents with:  Nasal Congestion: Dry cough, headaches, post nasal, bodyaches, chills, unable to sleep through night due to the cough, X 4 days  Fever: 103 X off and on       HPI:   Mary Capone is a 30year old female who pr GI: denies N/V/C or abdominal pain      EXAM:   /72   Pulse 116   Temp 98.5 °F (36.9 °C) (Oral)   Resp 20   Ht 68\"   Wt 155 lb (70.3 kg)   LMP 01/09/2020   SpO2 97%   Breastfeeding No   BMI 23.57 kg/m²   GENERAL: well developed, well nourished,in no The flu starts 1 to 3 days after you are exposed to the flu virus. It may last for 1 to 2 weeks but many people feel tired or fatigued for many weeks afterward. You usually don’t need to take antibiotics unless you have a complication.  This might be an ear · Cough with lots of colored mucus (sputum) or blood in your mucus  · Chest pain, shortness of breath, wheezing, or trouble breathing  · Severe headache, or face, neck, or ear pain  · New rash with fever  · Fever of 100.4°F (38°C) or higher, or as directed

## 2020-02-03 NOTE — PROGRESS NOTES
[facility] Follow-up Visit    Vivian NSAIDs/ASA use: Ibuprofen 600 mg PRN      History, Medications, Allergies, ROS:      Past Medical History:   Diagnosis Date   • ADHD (attention deficit hyperactivity disorder)    • Autism    • Autistic spectrum disorder    • Bipolar 1 disorder ([facility]    • M negative for cold intolerance and heat intolerance  MUSCULOSKELETAL:  negative for  joint stiffness and joint swelling  BEHAVIOR/PSYCH:  negative for depressed mood    PHYSICAL EXAM:   Blood pressure 108/71, pulse 106, height 5' 8\" (1.727 m), weight 164 l to follow-up in this event. All questions/concerns addressed. >25 minutes was spent with >50% in patient consultation/coordination of care.         Recommend:  -Continue MiraLAX 1 packet daily-titrate as needed and may supplement with Dulcolax  -Follow-up

## 2020-02-05 ENCOUNTER — TELEPHONE (OUTPATIENT)
Dept: INTERNAL MEDICINE CLINIC | Facility: CLINIC | Age: 25
End: 2020-02-05

## 2020-02-05 NOTE — TELEPHONE ENCOUNTER
Christelle (patient's mom) will be faxing over a camp physical form. Please fill right away once received because the camp is first come first serve based. She will call back with fax number.

## 2020-02-06 NOTE — TELEPHONE ENCOUNTER
Spoke with patient mom patient has appointment to see Dr Blaire Ramirez Monday Feb. 10th [address] paper work given to Dr Blaire Ramirez

## 2020-02-10 ENCOUNTER — OFFICE VISIT (OUTPATIENT)
Dept: GASTROENTEROLOGY | Facility: CLINIC | Age: 25
End: 2020-02-10
Payer: MEDICAID

## 2020-02-10 ENCOUNTER — OFFICE VISIT (OUTPATIENT)
Dept: INTERNAL MEDICINE CLINIC | Facility: CLINIC | Age: 25
End: 2020-02-10
Payer: MEDICAID

## 2020-02-10 VITALS
HEIGHT: 68 IN | HEART RATE: 106 BPM | DIASTOLIC BLOOD PRESSURE: 71 MMHG | WEIGHT: 164 LBS | SYSTOLIC BLOOD PRESSURE: 108 MMHG | BODY MASS INDEX: 24.86 KG/M2

## 2020-02-10 VITALS
TEMPERATURE: 98 F | RESPIRATION RATE: 18 BRPM | SYSTOLIC BLOOD PRESSURE: 106 MMHG | BODY MASS INDEX: 22.73 KG/M2 | DIASTOLIC BLOOD PRESSURE: 78 MMHG | HEART RATE: 72 BPM | WEIGHT: 150 LBS | HEIGHT: 68 IN

## 2020-02-10 DIAGNOSIS — K59.00 CONSTIPATION, UNSPECIFIED CONSTIPATION TYPE: Primary | ICD-10-CM

## 2020-02-10 PROBLEM — R14.0 ABDOMINAL BLOATING: Status: RESOLVED | Noted: 2017-09-05 | Resolved: 2020-02-10

## 2020-02-10 PROBLEM — J02.9 PHARYNGITIS: Status: RESOLVED | Noted: 2017-04-04 | Resolved: 2020-02-10

## 2020-02-10 PROCEDURE — 99204 OFFICE O/P NEW MOD 45 MIN: CPT | Performed by: NURSE PRACTITIONER

## 2020-02-10 PROCEDURE — 99213 OFFICE O/P EST LOW 20 MIN: CPT | Performed by: INTERNAL MEDICINE

## 2020-02-10 NOTE — PROGRESS NOTES
HPI:    Patient ID: Adan Olvera is a 30year old female. HPI she is here today for follow up and need paperwork to e filled out . She is going to camp in June   per her constipation resolved , she is having regular bowel movement .  She was seen by mg total) by mouth every 6 hours with food 20 tablet 0   • triamcinolone acetonide 0.1 % External Cream Apply to the aa of the extremities bid 453.5 g 1   • divalproex Sodium 250 MG Oral Tab EC DR tab Take 250 mg by mouth 3 (three) times daily.      • Jennifer Sr normal. Mucous membranes are not cyanotic. No oropharyngeal exudate, posterior oropharyngeal edema or posterior oropharyngeal erythema. Eyes: Pupils are equal, round, and reactive to light.  Conjunctivae and EOM are normal. Right eye exhibits no discharge this encounter       Imaging & Referrals:  None        #[id number]

## 2020-02-10 NOTE — PATIENT INSTRUCTIONS
-Continue MiraLAX 1 packet daily-titrate as needed and may supplement with Dulcolax  -Follow-up as needed

## 2020-09-17 RX ORDER — QUETIAPINE 100 MG/1
100 TABLET, FILM COATED ORAL DAILY
Qty: 90 TABLET | Refills: 1 | Status: SHIPPED | OUTPATIENT
Start: 2020-09-17

## 2020-09-17 RX ORDER — GUANFACINE 1 MG/1
1 TABLET ORAL 2 TIMES DAILY
Qty: 270 TABLET | Refills: 1 | Status: SHIPPED | OUTPATIENT
Start: 2020-09-17

## 2020-09-17 RX ORDER — QUETIAPINE 200 MG/1
300 TABLET, FILM COATED ORAL NIGHTLY
Qty: 90 TABLET | Refills: 1 | Status: SHIPPED | OUTPATIENT
Start: 2020-09-17 | End: 2021-02-22

## 2020-09-17 RX ORDER — DIVALPROEX SODIUM 250 MG/1
250 TABLET, DELAYED RELEASE ORAL 3 TIMES DAILY
Qty: 270 TABLET | Refills: 1 | Status: SHIPPED | OUTPATIENT
Start: 2020-09-17

## 2021-02-22 ENCOUNTER — OFFICE VISIT (OUTPATIENT)
Dept: INTERNAL MEDICINE CLINIC | Facility: CLINIC | Age: 26
End: 2021-02-22
Payer: MEDICAID

## 2021-02-22 VITALS
SYSTOLIC BLOOD PRESSURE: 96 MMHG | HEART RATE: 87 BPM | OXYGEN SATURATION: 95 % | WEIGHT: 172 LBS | BODY MASS INDEX: 26.07 KG/M2 | DIASTOLIC BLOOD PRESSURE: 64 MMHG | HEIGHT: 68 IN

## 2021-02-22 DIAGNOSIS — D50.8 OTHER IRON DEFICIENCY ANEMIA: ICD-10-CM

## 2021-02-22 DIAGNOSIS — Z12.4 SCREENING FOR CERVICAL CANCER: ICD-10-CM

## 2021-02-22 DIAGNOSIS — Z00.00 ANNUAL PHYSICAL EXAM: Primary | ICD-10-CM

## 2021-02-22 LAB
ALBUMIN SERPL-MCNC: 3.2 G/DL (ref 3.4–5)
ALBUMIN/GLOB SERPL: 0.8 {RATIO} (ref 1–2)
ALP LIVER SERPL-CCNC: 59 U/L
ALT SERPL-CCNC: 17 U/L
ANION GAP SERPL CALC-SCNC: 5 MMOL/L (ref 0–18)
AST SERPL-CCNC: 14 U/L (ref 15–37)
BASOPHILS # BLD AUTO: 0.06 X10(3) UL (ref 0–0.2)
BASOPHILS NFR BLD AUTO: 1.4 %
BILIRUB SERPL-MCNC: 0.2 MG/DL (ref 0.1–2)
BUN BLD-MCNC: 17 MG/DL (ref 7–18)
BUN/CREAT SERPL: 19.1 (ref 10–20)
CALCIUM BLD-MCNC: 9.1 MG/DL (ref 8.5–10.1)
CHLORIDE SERPL-SCNC: 108 MMOL/L (ref 98–112)
CO2 SERPL-SCNC: 26 MMOL/L (ref 21–32)
CREAT BLD-MCNC: 0.89 MG/DL
DEPRECATED RDW RBC AUTO: 49.4 FL (ref 35.1–46.3)
EOSINOPHIL # BLD AUTO: 0.04 X10(3) UL (ref 0–0.7)
EOSINOPHIL NFR BLD AUTO: 0.9 %
ERYTHROCYTE [DISTWIDTH] IN BLOOD BY AUTOMATED COUNT: 16.8 % (ref 11–15)
GLOBULIN PLAS-MCNC: 4.2 G/DL (ref 2.8–4.4)
GLUCOSE BLD-MCNC: 69 MG/DL (ref 70–99)
HCT VFR BLD AUTO: 36.9 %
HGB BLD-MCNC: 11.4 G/DL
IMM GRANULOCYTES # BLD AUTO: 0 X10(3) UL (ref 0–1)
IMM GRANULOCYTES NFR BLD: 0 %
IRON SATURATION: 7 %
IRON SERPL-MCNC: 33 UG/DL
LYMPHOCYTES # BLD AUTO: 1.7 X10(3) UL (ref 1–4)
LYMPHOCYTES NFR BLD AUTO: 38.4 %
M PROTEIN MFR SERPL ELPH: 7.4 G/DL (ref 6.4–8.2)
MCH RBC QN AUTO: 25.2 PG (ref 26–34)
MCHC RBC AUTO-ENTMCNC: 30.9 G/DL (ref 31–37)
MCV RBC AUTO: 81.6 FL
MONOCYTES # BLD AUTO: 0.53 X10(3) UL (ref 0.1–1)
MONOCYTES NFR BLD AUTO: 12 %
NEUTROPHILS # BLD AUTO: 2.1 X10 (3) UL (ref 1.5–7.7)
NEUTROPHILS # BLD AUTO: 2.1 X10(3) UL (ref 1.5–7.7)
NEUTROPHILS NFR BLD AUTO: 47.3 %
OSMOLALITY SERPL CALC.SUM OF ELEC: 288 MOSM/KG (ref 275–295)
PATIENT FASTING Y/N/NP: NO
PLATELET # BLD AUTO: 239 10(3)UL (ref 150–450)
POTASSIUM SERPL-SCNC: 4.6 MMOL/L (ref 3.5–5.1)
RBC # BLD AUTO: 4.52 X10(6)UL
SODIUM SERPL-SCNC: 139 MMOL/L (ref 136–145)
TOTAL IRON BINDING CAPACITY: 450 UG/DL (ref 240–450)
TRANSFERRIN SERPL-MCNC: 302 MG/DL (ref 200–360)
TSI SER-ACNC: 3.09 MIU/ML (ref 0.36–3.74)
VIT B12 SERPL-MCNC: 353 PG/ML (ref 193–986)
WBC # BLD AUTO: 4.4 X10(3) UL (ref 4–11)

## 2021-02-22 PROCEDURE — 3074F SYST BP LT 130 MM HG: CPT | Performed by: INTERNAL MEDICINE

## 2021-02-22 PROCEDURE — 36415 COLL VENOUS BLD VENIPUNCTURE: CPT | Performed by: INTERNAL MEDICINE

## 2021-02-22 PROCEDURE — 3008F BODY MASS INDEX DOCD: CPT | Performed by: INTERNAL MEDICINE

## 2021-02-22 PROCEDURE — 3078F DIAST BP <80 MM HG: CPT | Performed by: INTERNAL MEDICINE

## 2021-02-22 PROCEDURE — 99395 PREV VISIT EST AGE 18-39: CPT | Performed by: INTERNAL MEDICINE

## 2021-02-22 RX ORDER — QUETIAPINE 300 MG/1
1 TABLET, FILM COATED ORAL EVERY MORNING
COMMUNITY
Start: 2021-02-13

## 2021-02-22 NOTE — PROGRESS NOTES
HPI:    Patient ID: Enrique Jordan is a 30year old female. HPI     She came in today for physical she is going to special camp-and needs special camps  health form to be filled out    According to her mom she is doing okay.   She states that she still Take 1 tablet (100 mg total) by mouth daily. 90 tablet 1   • guanFACINE HCl 1 MG Oral Tab Take 1 tablet (1 mg total) by mouth 2 (two) times daily.  2mg in AM and 1mg in PM 1700 270 tablet 1   • divalproex Sodium 250 MG Oral Tab EC DR tab Take 1 tablet (250 maxillary sinus tenderness and no frontal sinus tenderness. Left sinus exhibits no maxillary sinus tenderness and no frontal sinus tenderness.    Mouth/Throat: Uvula is midline, oropharynx is clear and moist and mucous membranes are normal. Mucous membranes medication  Bipolar disorder stable continue current medication  Constipation discussed about her diet advised her her high-fiber diet can take Metamucil in the morning  No orders of the defined types were placed in this encounter.       Meds This Visit:  ROCÍO

## 2021-02-24 LAB — 25(OH)D3 SERPL-MCNC: 21.4 NG/ML (ref 30–100)

## 2021-02-25 ENCOUNTER — TELEPHONE (OUTPATIENT)
Dept: INTERNAL MEDICINE CLINIC | Facility: CLINIC | Age: 26
End: 2021-02-25

## 2021-02-25 NOTE — TELEPHONE ENCOUNTER
Left message to let her know forms are completed at [place] office   She can  or it will be send to [facility] scan department which it takes couple if days to see in [employer]

## 2021-02-25 NOTE — TELEPHONE ENCOUNTER
Mother (ALEXIS)  calling and wants to follow up the camp forms, requesting to send it through her personal e-mail. Informed that we cannot use personal e-mail but can send it through [employer] or fax it. Requesting to send it through [facility] [address].   Informed that bobby

## 2021-03-01 ENCOUNTER — TELEPHONE (OUTPATIENT)
Dept: INTERNAL MEDICINE CLINIC | Facility: CLINIC | Age: 26
End: 2021-03-01

## 2021-03-01 NOTE — TELEPHONE ENCOUNTER
Pt mother calling and she wanted to know if her daughter would be able to get the vaccination. I then informed her that as of now. We are still in the [employer] group and essential workers. Mother verbalized understanding.      Yash Hurtado is following

## 2022-02-09 RX ORDER — QUETIAPINE FUMARATE 100 MG/1
100 TABLET, FILM COATED ORAL EVERY MORNING
Qty: 90 TABLET | Refills: 0 | Status: SHIPPED | OUTPATIENT
Start: 2022-02-09 | End: 2022-04-12

## 2022-02-09 NOTE — TELEPHONE ENCOUNTER
Failed protocol. 90 day refill given on 02/09/22, appointment needed for further refills.       Requested Prescriptions   Pending Prescriptions Disp Refills    QUETIAPINE 100 MG Oral Tab [Pharmacy Med Name: QUETIAPINE FUMARATE 100MG TAB] 30 tablet 0     Sig: TAKE 1 TABLET BY MOUTH EVERY MORNING        Psychiatric Non-Scheduled (Anti-Anxiety) Failed - 2/9/2022 10:10 AM        Failed - Appointment in last 6 or next 3 months                Recent Outpatient Visits              11 months ago Annual physical exam    Kenny Orellana MD    Office Visit    2 years ago Constipation, unspecified constipation type    [facility] [place], Evaristo Preston MD    Office Visit    2 years ago Constipation, unspecified constipation type    [facility], [facility], APRN    Office Visit    2 years ago Flu-like symptoms    [facility], APRN    Office Visit    3 years ago Atopic dermatitis, unspecified type    Dermatology - Ayaka Bajwa MD    Office Visit

## 2022-02-14 ENCOUNTER — HOSPITAL ENCOUNTER (EMERGENCY)
Facility: HOSPITAL | Age: 27
Discharge: HOME | End: 2022-02-14
Attending: EMERGENCY MEDICINE
Payer: MEDICAID

## 2022-02-14 ENCOUNTER — HOSPITAL ENCOUNTER (OUTPATIENT)
Dept: GENERAL RADIOLOGY | Facility: HOSPITAL | Age: 27
Discharge: HOME | End: 2022-02-14
Attending: INTERNAL MEDICINE
Payer: MEDICAID

## 2022-02-14 ENCOUNTER — OFFICE VISIT (OUTPATIENT)
Dept: INTERNAL MEDICINE CLINIC | Facility: CLINIC | Age: 27
End: 2022-02-14
Payer: MEDICAID

## 2022-02-14 VITALS
BODY MASS INDEX: 26.83 KG/M2 | SYSTOLIC BLOOD PRESSURE: 102 MMHG | OXYGEN SATURATION: 96 % | HEIGHT: 68 IN | WEIGHT: 177 LBS | TEMPERATURE: 98 F | HEART RATE: 94 BPM | DIASTOLIC BLOOD PRESSURE: 67 MMHG

## 2022-02-14 VITALS
RESPIRATION RATE: 18 BRPM | BODY MASS INDEX: 23.54 KG/M2 | HEIGHT: 67 IN | SYSTOLIC BLOOD PRESSURE: 103 MMHG | TEMPERATURE: 98 F | WEIGHT: 150 LBS | DIASTOLIC BLOOD PRESSURE: 75 MMHG | HEART RATE: 67 BPM | OXYGEN SATURATION: 96 %

## 2022-02-14 DIAGNOSIS — K59.00 CONSTIPATION, UNSPECIFIED CONSTIPATION TYPE: Primary | ICD-10-CM

## 2022-02-14 DIAGNOSIS — K59.00 CONSTIPATION, UNSPECIFIED CONSTIPATION TYPE: ICD-10-CM

## 2022-02-14 DIAGNOSIS — K56.41 FECAL IMPACTION (HCC): Primary | ICD-10-CM

## 2022-02-14 LAB — B-HCG UR QL: NEGATIVE

## 2022-02-14 PROCEDURE — 81025 URINE PREGNANCY TEST: CPT

## 2022-02-14 PROCEDURE — 3008F BODY MASS INDEX DOCD: CPT | Performed by: INTERNAL MEDICINE

## 2022-02-14 PROCEDURE — 99214 OFFICE O/P EST MOD 30 MIN: CPT | Performed by: INTERNAL MEDICINE

## 2022-02-14 PROCEDURE — 99283 EMERGENCY DEPT VISIT LOW MDM: CPT

## 2022-02-14 PROCEDURE — 3078F DIAST BP <80 MM HG: CPT | Performed by: INTERNAL MEDICINE

## 2022-02-14 PROCEDURE — 74018 RADEX ABDOMEN 1 VIEW: CPT | Performed by: INTERNAL MEDICINE

## 2022-02-14 PROCEDURE — 3074F SYST BP LT 130 MM HG: CPT | Performed by: INTERNAL MEDICINE

## 2022-02-14 RX ORDER — POLYETHYLENE GLYCOL 3350 17 G/17G
17 POWDER, FOR SOLUTION ORAL DAILY
Qty: 30 EACH | Refills: 0 | Status: SHIPPED | OUTPATIENT
Start: 2022-02-14

## 2022-02-14 RX ORDER — DOCUSATE SODIUM 100 MG/1
100 CAPSULE, LIQUID FILLED ORAL 2 TIMES DAILY
Qty: 30 CAPSULE | Refills: 0 | Status: SHIPPED | OUTPATIENT
Start: 2022-02-14

## 2022-02-14 RX ORDER — ERGOCALCIFEROL 1.25 MG/1
1 CAPSULE ORAL WEEKLY
COMMUNITY
Start: 2022-02-07

## 2022-02-15 NOTE — ED INITIAL ASSESSMENT (HPI)
Pt to ER d/t constipation for the last 3 weeks. Pt went to PCP this morning and was sent to get an Xray d/t abnormal bowel sounds. Pt was told that she has a \"bowel impaction. \" PCP told pt she has a \"fecal impact. \" No N/V. Pt confirms being able to eat/ drink as normal.     Pt presents to the ER a&ox4. Respiration even and non labored. Skin warm and dry.      PMH: bowel impaction 2019

## 2022-02-16 ENCOUNTER — TELEPHONE (OUTPATIENT)
Dept: INTERNAL MEDICINE CLINIC | Facility: CLINIC | Age: 27
End: 2022-02-16

## 2022-02-16 NOTE — TELEPHONE ENCOUNTER
Received call from pharmacy, they do not have individual packets for polyethylene glycol, will dispense same quantity and dose just in 1 bottle and not individual packets.

## 2022-02-21 ENCOUNTER — TELEPHONE (OUTPATIENT)
Dept: INTERNAL MEDICINE CLINIC | Facility: CLINIC | Age: 27
End: 2022-02-21

## 2022-02-21 NOTE — TELEPHONE ENCOUNTER
Patients mother called to ask if patients physical form was filled and ready to be picked up. Patient needs it for camp and dates are being filled. Please contact patients mother as soon as possible.

## 2022-02-21 NOTE — TELEPHONE ENCOUNTER
She will need to make appointment with GI but she needs to increase her fiber intake that is why she is getting constipated.   When she was at the office I did send the MiraLAX she can take that every day as needed and Colace as well

## 2022-02-21 NOTE — TELEPHONE ENCOUNTER
I spoke with patient mother. I called her because we have a form for [facility] Form and Dr Christiana Sow will like to know how patient is taking her medication. Per Patient's mom everything is in her chart. Why we are asking her. I told her we just want to make sure because some patient take it at different times. I went thru all medications with her mom. She said Divalproex Sodium she takes it 2 times a day. Guanfacine 1 mg she takes  2 tab in the morning. Iron she takes 1 tablet in the morning. The other ones as prescribe. Patient's mom said her daughter went to ER on 2/14 because of constipation after the enema she was fine, but she hasn't had a bowel movement since 2/14. Her mom wants to know if she needs to see the GI or make a follow up with Dr Christiana Sow?

## 2022-02-21 NOTE — TELEPHONE ENCOUNTER
Just FYI. I Spoke with her mom. She states she made the appointment with GI on 4/28. They are book on February and March. Also provided her with Dr Partha Foley recommendation  to increase her fiber intake, to take Miralax every day as needed and Colace as well. Patient's mom verbalized understanding. Also I told her the form is ready to , and that I attached the medication list with the form. We left the medication area for her to fill out because they want to know how patient is taking the medication. If is in the morning, lunch, dinner at bedtime and the time. Patient's mom will  form at [facility] office tomorrow.

## 2022-02-26 ENCOUNTER — TELEPHONE (OUTPATIENT)
Dept: INTERNAL MEDICINE CLINIC | Facility: CLINIC | Age: 27
End: 2022-02-26

## 2022-02-26 NOTE — TELEPHONE ENCOUNTER
Mom states patient was seen in ER for fecal impaction on 2/14. States patient has not had a bowel movement since. Has been giving patient the docusate sodium and polyethylene glycol daily. Mom states patient's appetite is good and making sure she drinks plenty of water. Mom denies patient has any abdominal pain - \"she never complains of any pain. \"  States her abdomen does look somewhat distended. Advised to bring patient to urgent care as it has been almost two weeks since last bowel movement.

## 2022-03-01 NOTE — TELEPHONE ENCOUNTER
Tentatively scheduled Rashae for consult with [facility] pam PRUETT next Wednesday, 03/09/2022 at 9:00 am; [address] office. Contacted patients mother to confirm appointment date/time/location. Left message to call back. Will await call back and/or follow up. [facility] Staff--    Please confirm/decline GI consult with pam Mcelroy for next Wednesday, 03/09/2022 at 9:00 AM; [address] office. Thank you!      Future Appointments   Date Time Provider Jordon Beal   3/9/2022  9:00 AM Alice Stephen [facility]

## 2022-03-01 NOTE — TELEPHONE ENCOUNTER
If she did not have a bowel movement since then it is way too long. She most likely has fecal impaction again she might need to go to emergency room. Is she taking MiraLAX? If not I can send , needs to take daily prn , also can you please help her schedule sooner appointment with GI.   But again if she did not have bowel movement for such a long time she needs to go to ER

## 2022-03-01 NOTE — TELEPHONE ENCOUNTER
Please advise  Pt's mother calling back, did not go to UC  stated since going to ER for constipation- enema was given , no bowel movement, Pt does not seem to be in discomfort but states I keep trying, pt is eating and drinking lots of water, asked if abdomen was firm or distended, was not sure , not with pt at the present  Offered appt to be evaluated , unable to come today, appt made for tomorrow  Advised a list of high fiber foods  Pt's mother concern that GI appt to far 4/28/22, asking if pt can see some one else  , even if it has to be at [employer]

## 2022-03-01 NOTE — TELEPHONE ENCOUNTER
Spoke with patient, mother Christelle  (  verified ) informed of 's   instructions below      Mother clarified patient has not had any BM since   Mother is not in town today and will take patient to the [address] [place] [address] tomorrow when she returns  home     Canceled appt for tomorrow     Routing as FYI  To Dr. Anju Del Cid staff can you assist patient/ mother  with sooner appt ?     Please and thank you

## 2022-03-02 ENCOUNTER — HOSPITAL ENCOUNTER (EMERGENCY)
Age: 27
Discharge: HOME | End: 2022-03-02
Attending: EMERGENCY MEDICINE
Payer: MEDICAID

## 2022-03-02 ENCOUNTER — APPOINTMENT (OUTPATIENT)
Dept: GENERAL RADIOLOGY | Age: 27
End: 2022-03-02
Attending: PHYSICIAN ASSISTANT
Payer: MEDICAID

## 2022-03-02 VITALS
OXYGEN SATURATION: 100 % | WEIGHT: 160 LBS | HEART RATE: 80 BPM | TEMPERATURE: 99 F | SYSTOLIC BLOOD PRESSURE: 105 MMHG | DIASTOLIC BLOOD PRESSURE: 64 MMHG | BODY MASS INDEX: 25 KG/M2 | RESPIRATION RATE: 17 BRPM

## 2022-03-02 DIAGNOSIS — K59.00 CONSTIPATION, UNSPECIFIED CONSTIPATION TYPE: Primary | ICD-10-CM

## 2022-03-02 PROCEDURE — 74018 RADEX ABDOMEN 1 VIEW: CPT | Performed by: PHYSICIAN ASSISTANT

## 2022-03-02 PROCEDURE — 99283 EMERGENCY DEPT VISIT LOW MDM: CPT

## 2022-03-02 NOTE — ED INITIAL ASSESSMENT (HPI)
Fecal impaction , seen at [facility] 2/14 for same s.s . Had tap enema 2/14 and per mom has not used bathroom since then.

## 2022-03-02 NOTE — ED NOTES
I reviewed that chart and discussed the case. I have examined the patient and noted patient is a 24-year-old female presents emergency room with a history of recurrent bouts of constipation she has been constipated recently as per mother who is giving history at the bedside. The patient was seen in the emergency room last month for similar symptoms and did have success after a soapsuds enema given here in the ER. The patient has had no history of any vomiting has been eating normally as per patient's mother at the bedside. Patient has had no history of any fever. Patient denies history of any complaints of discomfort at this time. GENERAL: Well-developed, well-nourished female sitting up breathing easily in no apparent distress. Patient is nontoxic in appearance. LUNGS: Clear to auscultation bilaterally with no wheeze. There is good equal air entry bilaterally. HEART: Regular rate and rhythm. Normal S1, S2 no S3, or S4. No murmur. ABDOMEN: There is no focal tenderness to palpation appreciated anywhere throughout the abdomen. There is no guarding, no rebound, no mass, and no organomegaly appreciated. There is normoactive bowel sounds. There is no hernia. EXTREMITIES: There is no cyanosis, clubbing, or edema appreciated. Pulses are 2+ and equal in all 4 extremities. NEURO: Patient is awake, alert and appropriate as per mother at the bedside. . Motor strength is 5 over 5 in all 4 extremities. There are no gross motor or sensory deficits appreciated. Patient is up and ambulatory in the emergency room with a steady gait and no ataxia. XR ABDOMEN (1 VIEW) (CPT=74018)    Result Date: 3/2/2022  CONCLUSION:  Large rectal and colonic stool quantity consistent with constipation. Fecal impaction within the rectum is considered.    Dictated by (CST): Eboni Hernandez MD on 3/02/2022 at 12:35 PM     Finalized by (CST): Eboni Hrenandez MD on 3/02/2022 at 12:37 PM           Patient given soapsuds enema here in the emergency room with large bowel movement thereafterwards. Patient will be discharged home in mother's care at this time. .      I agree with the following clinical impression(s):  Acute fecal impaction. I agree with the plan as noted.

## 2022-03-04 NOTE — TELEPHONE ENCOUNTER
Spoke with patients mother, Hung Brown (ok per pedro pablo). Confirmed consult with aprn next Wednesday works with patient/patients mother schedule. Verified time, location and to arrive 15 minutes early. Expressed understanding with no further questions or concerns at this time.      Future Appointments   Date Time Provider Jordon Beal   3/9/2022  9:00 AM Alice Aguila [address] [place] [address]

## 2022-03-09 ENCOUNTER — TELEPHONE (OUTPATIENT)
Dept: GASTROENTEROLOGY | Facility: CLINIC | Age: 27
End: 2022-03-09

## 2022-03-09 ENCOUNTER — OFFICE VISIT (OUTPATIENT)
Dept: GASTROENTEROLOGY | Facility: CLINIC | Age: 27
End: 2022-03-09
Payer: MEDICAID

## 2022-03-09 VITALS
HEART RATE: 97 BPM | DIASTOLIC BLOOD PRESSURE: 71 MMHG | SYSTOLIC BLOOD PRESSURE: 105 MMHG | WEIGHT: 174 LBS | HEIGHT: 67 IN | BODY MASS INDEX: 27.31 KG/M2

## 2022-03-09 DIAGNOSIS — D50.9 IRON DEFICIENCY ANEMIA, UNSPECIFIED IRON DEFICIENCY ANEMIA TYPE: ICD-10-CM

## 2022-03-09 DIAGNOSIS — K59.00 CONSTIPATION, UNSPECIFIED CONSTIPATION TYPE: Primary | ICD-10-CM

## 2022-03-09 PROCEDURE — 99215 OFFICE O/P EST HI 40 MIN: CPT | Performed by: NURSE PRACTITIONER

## 2022-03-09 PROCEDURE — 3074F SYST BP LT 130 MM HG: CPT | Performed by: NURSE PRACTITIONER

## 2022-03-09 PROCEDURE — 3078F DIAST BP <80 MM HG: CPT | Performed by: NURSE PRACTITIONER

## 2022-03-09 PROCEDURE — 3008F BODY MASS INDEX DOCD: CPT | Performed by: NURSE PRACTITIONER

## 2022-03-09 RX ORDER — POLYETHYLENE GLYCOL 3350, SODIUM CHLORIDE, SODIUM BICARBONATE, POTASSIUM CHLORIDE 420; 11.2; 5.72; 1.48 G/4L; G/4L; G/4L; G/4L
POWDER, FOR SOLUTION ORAL
Qty: 4000 ML | Refills: 0 | Status: SHIPPED | OUTPATIENT
Start: 2022-03-09

## 2022-03-09 NOTE — PATIENT INSTRUCTIONS
-linzess 72 mcg  -dulcolax suppository every 3rd day if no bm  -er if condition decline    1. Schedule colonoscopy with [facility] w/ Dr. Nam Batch [Diagnosis: constipation]    2.  bowel prep from pharmacy (split trilyte)  -Buy over the counter dulcolax laxative, and take one tablet daily for 3 days prior to drinking the bowel prep. 3. Hold iron 7 days prior to procedure    4. Read all bowel prep instructions carefully    5. AVOID seeds, nuts, popcorn, raw fruits and vegetables (cooked is okay) for 2-3 days before procedure    6. You will need to be tested for COVID within 72 hours of your procedure. You will be contacted with instructions on how to do this.       >>>Please note: if you were prescribed Suprep for the bowel prep and it is too expensive or not covered by insurance, it is okay to substitute Trilyte (or any similar generic prep). This can be done by notifying the pharmacy or calling our office.

## 2022-03-09 NOTE — TELEPHONE ENCOUNTER
Scheduled for: Colonoscopy [address]   Provider Name: Dr Doug Mckinley  Date: Shaheed Sidhu 5/05/2022  Location: [address] [place] [address] 1  Sedation: [facility]   Time: 11 am   Prep: split trilyte   Meds/Allergies Reconciled?: reviewed by provider   Diagnosis with codes: constipation K59.00   Was patient informed to call insurance with codes (Y/N): Yes  Referral sent?:  Yes  [facility] or [facility]  notified?: I sent an electronic request to Endo Scheduling and received a confirmation today. Medication Orders: -Buy over the counter dulcolax laxative, and take one tablet daily for 3 days prior to drinking the bowel prep. Pt is aware to NOT take iron pills, herbal meds and diet supplements for 7 days before exam. Also to NOT take any form of alcohol, recreational drugs and any forms of ED meds 24 hours before exam.     Misc Orders: Patient was informed that they will need a COVID 19 test prior to their procedure. Patient verbally understood & will await a phone call from [facility] to schedule. Further instructions given by staff:  Instructions given and pt verbalized understanding

## 2022-03-10 ENCOUNTER — TELEPHONE (OUTPATIENT)
Dept: GASTROENTEROLOGY | Facility: CLINIC | Age: 27
End: 2022-03-10

## 2022-03-10 NOTE — TELEPHONE ENCOUNTER
PPD Prior Authorization Department, can you please assist with obtaining a PA on [employer]. Thank you.

## 2022-03-11 NOTE — TELEPHONE ENCOUNTER
PPD Team--    Please assist with prior authorization for linaclotide (linzess) 72 mcg daily. - Constipation, unspecified; K59.00     Tried and failed miralax & dulcolax. Thank you!

## 2022-03-11 NOTE — TELEPHONE ENCOUNTER
You Bourgeois prior authorization required for constipation therapy. Ensured I will personally call her once approval/denial determination provided. Expressed understanding and appreciative for update. Forwarded to PPD for further assistance.

## 2022-03-14 ENCOUNTER — TELEPHONE (OUTPATIENT)
Dept: GASTROENTEROLOGY | Facility: CLINIC | Age: 27
End: 2022-03-14

## 2022-03-14 NOTE — TELEPHONE ENCOUNTER
Mother states pt continues to have constipation. Mother states pt tried Dulcolax enema and it did not work. Mother states she gave pt a different type of enema and pt was able to have one small bowel movement. Mother inquiring what she can do to help treat pts symptoms until PA for Linzess has been approved.  Please call [phone]

## 2022-03-14 NOTE — TELEPHONE ENCOUNTER
apn contacted pt. She endorses is passing gas. No abd pain, but hasn't complained of pain historically. No n/v.  Had small amount of stool today after fleets suppository. Has bloating, but mom says gets bloated with period and is on her cycle. She does not tolerate milk of zheng langford pa pending. Advise:  Repeat dose of miralax now.   Tap water enema  Continue miralax bid  Er if condition decline for imaging to exclude obstruction

## 2022-03-14 NOTE — TELEPHONE ENCOUNTER
Leyla Sánchez--    Spoke with Christelle to get update. Melina Maddox has not moved her bowels since office visit (03/09/2022). Mother administrated dulcolax suppository Saturday with no output or urgency. Additional suppository given this morning (fleet enema) with \"one drop of stool\". Drinking chicken broth & smooth tea throughout the day. Appetite is very good which she's concerned no output & distended stomach. Abdomen is slightly distended and bloated. Denies severe/sharp pains. On menstrual cycling which is causing lower abdominal cramping. Denies n/v, fever, chills. Mother requesting alternatives otc option in the interim prior to [address] [place] [address] approval.     Very concerned about possible obstruction. Melina Maddox does not experience typical signs of obstruction. Wants to ensure she starts moving her bowels as soon as possible. Please advise on further recommendations, if appropriate. Spoke with vIana Morales from [facility] to get linzess PA update due to urgency. Submitted PA form & chart notes to [employer] this morning. Typically response provided within 72 hours.      Thank you

## 2022-03-15 NOTE — TELEPHONE ENCOUNTER
apn contacted pt to check status. They did not do tap water enema yet. Mother says patient is having her period and will try tap water enema today. Advise er if appears significantly bloated/distended, abd pain, vomiting, not passing gas. They verbalize understanding and are in agreement with plan.

## 2022-04-05 ENCOUNTER — HOSPITAL ENCOUNTER (EMERGENCY)
Age: 27
Discharge: HOME | End: 2022-04-05
Attending: EMERGENCY MEDICINE
Payer: MEDICAID

## 2022-04-05 VITALS
DIASTOLIC BLOOD PRESSURE: 90 MMHG | HEART RATE: 102 BPM | BODY MASS INDEX: 25.11 KG/M2 | TEMPERATURE: 98 F | HEIGHT: 67 IN | OXYGEN SATURATION: 99 % | RESPIRATION RATE: 18 BRPM | WEIGHT: 160 LBS | SYSTOLIC BLOOD PRESSURE: 145 MMHG

## 2022-04-05 DIAGNOSIS — K59.00 CONSTIPATION, UNSPECIFIED CONSTIPATION TYPE: Primary | ICD-10-CM

## 2022-04-05 PROCEDURE — 99283 EMERGENCY DEPT VISIT LOW MDM: CPT

## 2022-04-05 RX ORDER — POLYETHYLENE GLYCOL 3350, SODIUM CHLORIDE, SODIUM BICARBONATE, POTASSIUM CHLORIDE 420; 11.2; 5.72; 1.48 G/4L; G/4L; G/4L; G/4L
4000 POWDER, FOR SOLUTION ORAL ONCE
Qty: 4000 ML | Refills: 0 | Status: SHIPPED | OUTPATIENT
Start: 2022-04-05 | End: 2022-04-05

## 2022-04-05 NOTE — ED INITIAL ASSESSMENT (HPI)
Chronic constipation- mother states she has not had bowel movement since mid March-- mom gave an enema on Sunday with no results

## 2022-04-07 ENCOUNTER — TELEPHONE (OUTPATIENT)
Dept: GASTROENTEROLOGY | Facility: CLINIC | Age: 27
End: 2022-04-07

## 2022-04-07 NOTE — TELEPHONE ENCOUNTER
Patient's mother called requesting if there is a cancellation for pt to have procedure sooner. Patient was just at the ER for constipation and extended abdomen. She would like a call back .

## 2022-04-07 NOTE — TELEPHONE ENCOUNTER
Patients Colonoscopy procedure scheduled to 04/14/2022 @ [facility] patient has prep instructions to be sent via my chart     Rescheduled for:  Colonoscopy [id number]  Provider Name:  Dr Suraj Santiago  Date:  From 05/05/2022              To 04/14/2022   Location:   [facility]   Sedation:  MAC  Time: from 1100              To  1400 (pt is aware to arrive at 1300  Prep: trilyte    Meds/Allergies Reconciled?:  Physician reviewed   Diagnosis with codes: constipation K59.00 abdomen distention R14.0   Was patient informed to call insurance with codes (Y/N):  Yes, I confirmed [employer] with the patient. Referral sent?:  Yes   [facility] or [address] notified?:  I sent an electronic request to Endo Scheduling and received a confirmation today. Medication Orders: This patient verbally confirmed that she is not taking:   Iron, blood thinners, BP meds, and is not diabetic   Not latex allergy, Not PCN allergy and does not have a pacemaker  Patient is aware to hold her vitamins and supplements x 7 days prior to procedure      Misc Orders:       Further instructions given by staff: This patient had no questions regarding the prep instructions

## 2022-04-08 NOTE — PAT NURSING NOTE
Verified with patient that procedure will be performed at [facility] and not at [facility] AND [facility], address provided. Patient verbalized understanding and agreed.

## 2022-04-12 RX ORDER — QUETIAPINE FUMARATE 100 MG/1
100 TABLET, FILM COATED ORAL DAILY
Qty: 90 TABLET | Refills: 1 | Status: SHIPPED | OUTPATIENT
Start: 2022-04-12

## 2022-04-12 NOTE — TELEPHONE ENCOUNTER
Please review; protocol failed/no protocol    Last Rx for both medications show 9/17/2020    No overdue labs for patient to complete    Please send, if appropriate    Requested Prescriptions   Pending Prescriptions Disp Refills    GUANFACINE 1 MG Oral Tab [Pharmacy Med Name: GUANFACINE HYDROCHLORIDE 1MG TAB] 90 tablet 0     Sig: TAKE TWO TABLETS BY MOUTH IN THE MORNING AND 1 TABLET AT 5 IN THE EVENING 03/01/2022        Hypertensive Medications Protocol Failed - 4/12/2022  4:52 PM        Failed - CMP or BMP in past 12 months        Passed - Appointment in past 6 or next 3 months        Passed - GFR  > 50     Lab Results   Component Value Date    GFRAA 103 02/22/2021                    DIVALPROEX  MG Oral Tab EC DR tab [Pharmacy Med Name: DIVALPROEX SODIUM 250MG DR TAB] 90 tablet 0     Sig: TAKE 1 TABLET BY MOUTH THREE TIMES A DAY 04/12/2022        Neurology Medications Passed - 4/12/2022  4:52 PM        Passed - Appointment in the past 6 or next 3 months          Signed Prescriptions Disp Refills    QUEtiapine 100 MG Oral Tab 90 tablet 1     Sig: Take 1 tablet (100 mg total) by mouth daily.         Psychiatric Non-Scheduled (Anti-Anxiety) Passed - 4/12/2022  4:52 PM        Passed - Appointment in last 6 or next 3 months               Recent Outpatient Visits              1 month ago Constipation, unspecified constipation type    [address] Lanis Mcburney, APRN    Office Visit    1 month ago Constipation, unspecified constipation type    Josue Mireles MD    Office Visit    1 year ago Annual physical exam    Giles Plascencia MD    Office Visit    2 years ago Constipation, unspecified constipation type    Giles Plascencia MD    Office Visit    2 years ago Constipation, unspecified constipation type    [facility], [facility], [facility], JUDE Iniguez    Office Visit             Future Appointments         Provider Department Appt Notes    In 2 days [place], PROCEDURE [address] GI PROCEDURE colon @ [facility]

## 2022-04-12 NOTE — TELEPHONE ENCOUNTER
Refill passed per [address] protocol.      Requested Prescriptions   Pending Prescriptions Disp Refills    QUETIAPINE 100 MG Oral Tab [Pharmacy Med Name: QUETIAPINE FUMARATE 100MG TAB] 30 tablet 0     Sig: TAKE 1 TABLET BY MOUTH EVERY MORNING 04/12/2022        Psychiatric Non-Scheduled (Anti-Anxiety) Passed - 4/12/2022  4:52 PM        Passed - Appointment in last 6 or next 3 months           GUANFACINE 1 MG Oral Tab [Pharmacy Med Name: GUANFACINE HYDROCHLORIDE 1MG TAB] 90 tablet 0     Sig: TAKE TWO TABLETS BY MOUTH IN THE MORNING AND 1 TABLET AT 5 IN THE EVENING 03/01/2022        Hypertensive Medications Protocol Failed - 4/12/2022  4:52 PM        Failed - CMP or BMP in past 12 months        Passed - Appointment in past 6 or next 3 months        Passed - GFR  > 50     Lab Results   Component Value Date    GFRAA 103 02/22/2021                    DIVALPROEX  MG Oral Tab EC DR tab [facility] Med Name: DIVALPROEX SODIUM 250MG DR TAB] 90 tablet 0     Sig: TAKE 1 TABLET BY MOUTH THREE TIMES A DAY 04/12/2022        Neurology Medications Passed - 4/12/2022  4:52 PM        Passed - Appointment in the past 6 or next 3 months                Recent Outpatient Visits              1 month ago Constipation, unspecified constipation type    [address] JUDE Costa    Office Visit    1 month ago Constipation, unspecified constipation type    [facility], Graham Felton MD    Office Visit    1 year ago Annual physical exam    Angelito Albright MD    Office Visit    2 years ago Constipation, unspecified constipation type    Angelito Albright MD    Office Visit    2 years ago Constipation, unspecified constipation type    [address] [place], [place], APRN    Office Visit             Future Appointments         Provider Department Appt Notes In 2 days [place], PROCEDURE [address] GI PROCEDURE colon @ [facility]

## 2022-04-13 RX ORDER — GUANFACINE 1 MG/1
1 TABLET ORAL NIGHTLY
Qty: 90 TABLET | Refills: 0 | Status: SHIPPED | OUTPATIENT
Start: 2022-04-13

## 2022-04-13 RX ORDER — DIVALPROEX SODIUM 250 MG/1
250 TABLET, DELAYED RELEASE ORAL 3 TIMES DAILY
Qty: 270 TABLET | Refills: 0 | Status: SHIPPED | OUTPATIENT
Start: 2022-04-13

## 2022-04-14 ENCOUNTER — ANESTHESIA (OUTPATIENT)
Dept: ENDOSCOPY | Age: 27
End: 2022-04-14
Payer: MEDICAID

## 2022-04-14 ENCOUNTER — HOSPITAL ENCOUNTER (OUTPATIENT)
Age: 27
Setting detail: HOSPITAL OUTPATIENT SURGERY
Discharge: HOME | End: 2022-04-14
Attending: INTERNAL MEDICINE | Admitting: INTERNAL MEDICINE
Payer: MEDICAID

## 2022-04-14 ENCOUNTER — ANESTHESIA EVENT (OUTPATIENT)
Dept: ENDOSCOPY | Age: 27
End: 2022-04-14
Payer: MEDICAID

## 2022-04-14 VITALS
HEIGHT: 67 IN | SYSTOLIC BLOOD PRESSURE: 107 MMHG | RESPIRATION RATE: 15 BRPM | BODY MASS INDEX: 24.96 KG/M2 | DIASTOLIC BLOOD PRESSURE: 83 MMHG | OXYGEN SATURATION: 100 % | TEMPERATURE: 97 F | HEART RATE: 61 BPM | WEIGHT: 159 LBS

## 2022-04-14 DIAGNOSIS — K59.00 CONSTIPATION, UNSPECIFIED CONSTIPATION TYPE: ICD-10-CM

## 2022-04-14 PROCEDURE — 81025 URINE PREGNANCY TEST: CPT

## 2022-04-14 RX ORDER — SODIUM CHLORIDE, SODIUM LACTATE, POTASSIUM CHLORIDE, CALCIUM CHLORIDE 600; 310; 30; 20 MG/100ML; MG/100ML; MG/100ML; MG/100ML
INJECTION, SOLUTION INTRAVENOUS CONTINUOUS
Status: DISCONTINUED | OUTPATIENT
Start: 2022-04-14 | End: 2022-04-14

## 2022-04-14 RX ORDER — LIDOCAINE HYDROCHLORIDE 10 MG/ML
INJECTION, SOLUTION EPIDURAL; INFILTRATION; INTRACAUDAL; PERINEURAL AS NEEDED
Status: DISCONTINUED | OUTPATIENT
Start: 2022-04-14 | End: 2022-04-14 | Stop reason: SURG

## 2022-04-14 RX ADMIN — LIDOCAINE HYDROCHLORIDE 50 MG: 10 INJECTION, SOLUTION EPIDURAL; INFILTRATION; INTRACAUDAL; PERINEURAL at 13:12:00

## 2022-04-14 RX ADMIN — SODIUM CHLORIDE, SODIUM LACTATE, POTASSIUM CHLORIDE, CALCIUM CHLORIDE: 600; 310; 30; 20 INJECTION, SOLUTION INTRAVENOUS at 13:12:00

## 2022-04-14 NOTE — OPERATIVE REPORT
FLEXIBLE SIGMOIDOSCOPY REPORT    Pooja Nava     5/10/1995 Age 27year old   PCP Shannon Jaffe MD Endoscopist Yara Kong MD     Date of procedure: 22    Procedure: Flexible sigmoidoscopy w/disimpaction, stool removal    Pre-operative diagnosis: constipation    Post-operative diagnosis: likely motility and possible dysenergic constipation     Medications:     Complications: none    Procedure:  Informed consent was obtained from the patient after the risks of the procedure were discussed, including but not limited to bleeding, perforation, aspiration, infection, or possibility of a missed lesion. After discussions of the risks/benefits and alternatives to this procedure, as well as the planned sedation, the patient was placed in the left lateral decubitus position and begun on continuous blood pressure pulse oximetry and EKG monitoring and this was maintained throughout the procedure. Once an adequate level of sedation was obtained a digital rectal exam was completed. Then the lubricated tip of the [id number] diagnostic video sigmoidoscope was inserted and advanced without difficulty to the using the air insufflation technique. Withdrawal was begun with thorough washing and careful examination of the colonic walls and folds. Views of the colon were POOR with washing. We then withdrew the instrument from the patient who tolerated the procedure well. Complications: none. Findings:   1. Large stool ball removed with disimpaction and with weller net  2. Diverticulosis: NON appreciated  3. No evidence of angioectasias or inflammation. 4. A retroflexed view of the rectum not done. 5. MONICO: stool     Impression:   1. constipation    Plan:  1. Follow up with Dr. Oliver Causey at [place]  2. Linzess and miralax daily  3.  Enema weekly      >>>If tissue was sampled/removed and you have not received your pathology results either by phone or letter within 2 weeks, please call our office at [phone].     Specimens:   NONE

## 2022-04-14 NOTE — ANESTHESIA POSTPROCEDURE EVALUATION
Patient: Chata Encinas    Procedure Summary     Date: 04/14/22 Room / Location: [facility] ENDOSCOPY 01 / [facility] ENDO    Anesthesia Start: 0329 Anesthesia Stop: 1330    Procedure: COLONOSCOPY (N/A ) Diagnosis:       Constipation, unspecified constipation type      (poor prep)    Surgeons: Basilio Zelaya MD Anesthesiologist:     Anesthesia Type: MAC ASA Status: 2          Anesthesia Type: MAC    Vitals Value Taken Time   BP 94/66 04/14/22 1330   Temp  04/14/22 1331   Pulse 65 04/14/22 1330   Resp 14 04/14/22 1330   SpO2 100 % 04/14/22 1330       [facility] AN Post Evaluation:   Patient Evaluated in PACU  Patient Participation: complete - patient participated  Level of Consciousness: awake and alert  Pain Management: adequate  Airway Patency:patent  Yes    Cardiovascular Status: acceptable  Respiratory Status: acceptable  Postoperative Hydration acceptable      Skip Segura MD  4/14/2022 1:31 PM

## 2022-04-15 LAB — B-HCG UR QL: NEGATIVE

## 2022-04-19 ENCOUNTER — TELEPHONE (OUTPATIENT)
Dept: INTERNAL MEDICINE CLINIC | Facility: CLINIC | Age: 27
End: 2022-04-19

## 2022-04-19 NOTE — TELEPHONE ENCOUNTER
Spoke with mom and relayed Dr. Forest Gaucher message below--mom verbalizes understanding, but asking if you can sign pended referral or if you recommend another specialist equivalent to Dr. Edith Mason, because she needs to get patient sooner appt with a specialist.    Provider below is affiliated with [facility] advise if this specialist is appropriate, or sign pended referral per mom's request    Dayron Oleary MD   Specialties:   Hepatology  [address] at [address] [facility]  [address]  [phone]

## 2022-04-19 NOTE — TELEPHONE ENCOUNTER
Spoke with mom ( and ALEXIS verified)--reports Dr. Tyrell Day did refer patient to GI specialist below, but soonest appt is in August at [facility] location. Specialist office requesting referral to help patient get sooner appt than August    Referral pended with Dx: constipation    Please advise     [facility].  Pratibha Weiss, [address] [facility]/Hepatology   [address] [id number]   Phone: [phone]: [phone]

## 2022-04-20 NOTE — TELEPHONE ENCOUNTER
Johann Farris,  I received call from patient's mom regarding follow-up appointment with GI at the [facility] they told her that the soonest appointment is in August.  Patient's mom is very upset because she says that she has constant constipation and Vikash Nielson is not helping her and she was told from Dr. Liliana Ramirez office that if you can put the referral is emergency referral they can see her sooner. Is this something that you can do?   Thank you

## 2022-04-20 NOTE — TELEPHONE ENCOUNTER
I will call her back but I spent over 30 minutes after the procedure talking to her. I also wrote down and found the information for Dr. Niles Ellis and she took a picture of it. Per patient's mom her daughter has had constipation as long as she can remember but now she can't go for weeks. So this has been a progressive problem and cannot be worked up and fixed right away. I did put in the order as stat. There is nothing else I can do to get her in earlier.      Called her and discussed  Was angry because someone told her we cannot put in a referral  I said ok to put in referral and placed urgent  Now order is in and she should be able to schedule

## 2022-04-20 NOTE — TELEPHONE ENCOUNTER
Sure I changed it to Stat/urgent but as I told the mother, this has been going on for years and there is no quick solution. She has severe constipation and likely chronically worsening.  She needs pelvic floor therapy and needs to take medications regularly    Thank you    Gi staff please let patient's mother know the referral is in stat

## 2022-04-20 NOTE — TELEPHONE ENCOUNTER
I spoke with her , she needs to call gi  and see if she can expedite , because I dont  him , but I will  check with gi

## 2022-06-09 ENCOUNTER — TELEPHONE (OUTPATIENT)
Dept: INTERNAL MEDICINE CLINIC | Facility: CLINIC | Age: 27
End: 2022-06-09

## 2022-06-09 LAB — AMB EXT COVID-19 RESULT: DETECTED

## 2022-06-09 NOTE — TELEPHONE ENCOUNTER
Pt's mother stated Pt was was exposed to person at camp with COVID,  Tested positive today, s/s throat hurting , not feeling , body aches, mother on her way to get Pt   Pt only has allergies

## 2022-06-09 NOTE — TELEPHONE ENCOUNTER
She can try infusion , I am not sure if she will meet the criteria , she can  go to [facility]

## 2022-06-09 NOTE — TELEPHONE ENCOUNTER
She needs to drink fluids , take tylenol, salt gargles for sore throat , if not better go to [facility] .  Take vitamin c

## 2022-06-09 NOTE — TELEPHONE ENCOUNTER
Mom called per ALEXIS relayed PCP message. Mom wants to know if patient can get covid pills take or the infusion. Please advise.

## 2022-06-13 ENCOUNTER — TELEPHONE (OUTPATIENT)
Dept: INTERNAL MEDICINE CLINIC | Facility: CLINIC | Age: 27
End: 2022-06-13

## 2022-06-13 ENCOUNTER — TELEMEDICINE (OUTPATIENT)
Dept: INTERNAL MEDICINE CLINIC | Facility: CLINIC | Age: 27
End: 2022-06-13
Payer: MEDICAID

## 2022-06-13 DIAGNOSIS — U07.1 COVID-19: Primary | ICD-10-CM

## 2022-06-13 NOTE — TELEPHONE ENCOUNTER
Patient's mom called (name, date of birth, and address verified) states patient is COVID positive since Thursday 6/9/22. Patient vomited several times today and is having chest congestion and cough. Per mom,last time she had a fever was last week of 100. Patient also having more chest congestion. Mom advised to keep patient hydrated, small sips of water mixed with Gatorade, to watch for very dry mouth, lethargy, dizziness, moderate to severe shortness to breath. If any of the se symptoms to go to ER. Patient scheduled for video visit. Patient advised to complete the e-check in in [employer], if active. Understands to follow the prompts and links to complete the visit. Patient advised that there may be a co-pay involved in this type of visit. Patient agreed to proceed, they understand the provider may be calling from a blocked, or unknown phone number on their caller ID and they know to answer the phone.

## 2022-07-12 RX ORDER — GUANFACINE 1 MG/1
TABLET ORAL
Qty: 90 TABLET | Refills: 0 | Status: SHIPPED | OUTPATIENT
Start: 2022-07-12

## 2022-07-22 ENCOUNTER — NURSE TRIAGE (OUTPATIENT)
Dept: INTERNAL MEDICINE CLINIC | Facility: CLINIC | Age: 27
End: 2022-07-22

## 2022-07-22 ENCOUNTER — HOSPITAL ENCOUNTER (EMERGENCY)
Age: 27
Discharge: HOME | End: 2022-07-22
Attending: EMERGENCY MEDICINE
Payer: MEDICAID

## 2022-07-22 ENCOUNTER — TELEPHONE (OUTPATIENT)
Dept: INTERNAL MEDICINE CLINIC | Facility: CLINIC | Age: 27
End: 2022-07-22

## 2022-07-22 VITALS
OXYGEN SATURATION: 99 % | RESPIRATION RATE: 16 BRPM | BODY MASS INDEX: 25.11 KG/M2 | DIASTOLIC BLOOD PRESSURE: 56 MMHG | WEIGHT: 160 LBS | TEMPERATURE: 98 F | HEART RATE: 91 BPM | SYSTOLIC BLOOD PRESSURE: 94 MMHG | HEIGHT: 67 IN

## 2022-07-22 DIAGNOSIS — K04.7 DENTAL INFECTION: Primary | ICD-10-CM

## 2022-07-22 PROCEDURE — 99283 EMERGENCY DEPT VISIT LOW MDM: CPT

## 2022-07-22 RX ORDER — CLINDAMYCIN HYDROCHLORIDE 300 MG/1
300 CAPSULE ORAL 3 TIMES DAILY
Qty: 30 CAPSULE | Refills: 0 | Status: SHIPPED | OUTPATIENT
Start: 2022-07-22 | End: 2022-08-01

## 2022-07-26 NOTE — TELEPHONE ENCOUNTER
Christelle states patient had Flex Sig on 12/5/2019 and ever since procedure patient has had constipation. Please call. Thank you. Improved

## 2022-08-09 RX ORDER — TRIAMCINOLONE ACETONIDE 1 MG/G
1 OINTMENT TOPICAL 2 TIMES DAILY
Qty: 454 G | Refills: 0 | Status: SHIPPED | OUTPATIENT
Start: 2022-08-09 | End: 2023-05-15 | Stop reason: ALTCHOICE

## 2022-08-10 RX ORDER — LINACLOTIDE 72 UG/1
CAPSULE, GELATIN COATED ORAL
Qty: 30 CAPSULE | Refills: 3 | Status: SHIPPED | OUTPATIENT
Start: 2022-08-10

## 2022-09-09 RX ORDER — QUETIAPINE FUMARATE 100 MG/1
100 TABLET, FILM COATED ORAL EVERY MORNING
Qty: 90 TABLET | Refills: 1 | Status: SHIPPED | OUTPATIENT
Start: 2022-09-09

## 2022-09-09 RX ORDER — DIVALPROEX SODIUM 250 MG/1
250 TABLET, DELAYED RELEASE ORAL 3 TIMES DAILY
Qty: 270 TABLET | Refills: 1 | Status: SHIPPED | OUTPATIENT
Start: 2022-09-09

## 2022-09-09 RX ORDER — QUETIAPINE FUMARATE 300 MG/1
300 TABLET, FILM COATED ORAL EVERY EVENING
Qty: 90 TABLET | Refills: 1 | Status: SHIPPED | OUTPATIENT
Start: 2022-09-09

## 2022-09-09 NOTE — TELEPHONE ENCOUNTER
Please review all medications. Protocol failed/ No protocol.     Requested Prescriptions   Pending Prescriptions Disp Refills    QUETIAPINE 100 MG Oral Tab [Pharmacy Med Name: QUETIAPINE FUMARATE 100MG TAB] 30 tablet 0     Sig: TAKE 1 TABLET BY MOUTH EVERY MORNING 7/12/22        There is no refill protocol information for this order        QUETIAPINE 300 MG Oral Tab [Pharmacy Med Name: QUETIAPINE FUMARATE 300MG TAB] 30 tablet 0     Sig: TAKE 1 TABLET BY MOUTH EVERY EVENING 7/12/22        There is no refill protocol information for this order        DIVALPROEX  MG Oral Tab EC DR tab [employer] Med Name: DIVALPROEX SODIUM 250MG DR TAB] 90 tablet 0     Sig: TAKE 1 TABLET BY MOUTH THREE TIMES A DAY 7/12/22        Neurology Medications Passed - 9/9/2022  4:53 PM        Passed - In person appointment or virtual visit in the past 6 mos or appointment in next 3 mos       Recent Outpatient Visits              2 months ago [address] [facility], [address] [facility] [facility] Shiva Shanks MD    Telemedicine    6 months ago Constipation, unspecified constipation type    [employer], [address], [place], [facility]    Office Visit    6 months ago Constipation, unspecified constipation type    Festus tAkins MD    Office Visit    1 year ago Annual physical exam    Festus Atkins MD    Office Visit    2 years ago Constipation, unspecified constipation type    Festus Atkins MD    Office Visit                       Recent Outpatient Visits              2 months ago COVID-19    [employer], [address] [facility] [facility] Shiva Shanks MD    Telemedicine    6 months ago Constipation, unspecified constipation type    [employer] [facility], [address], [facility], APRN    Office Visit    6 months ago Constipation, unspecified constipation type    [employer], [address], Jessica Torres MD    Office Visit    1 year ago Annual physical exam    William Sheth MD    Office Visit    2 years ago Constipation, unspecified constipation type    Marchelle Lesch, Jessica Torres MD    Office Visit

## 2022-10-05 RX ORDER — GUANFACINE 1 MG/1
TABLET ORAL
Qty: 90 TABLET | Refills: 0 | Status: SHIPPED | OUTPATIENT
Start: 2022-10-05

## 2022-12-07 ENCOUNTER — APPOINTMENT (OUTPATIENT)
Dept: CT IMAGING | Facility: HOSPITAL | Age: 27
End: 2022-12-07
Attending: EMERGENCY MEDICINE
Payer: MEDICAID

## 2022-12-07 ENCOUNTER — HOSPITAL ENCOUNTER (INPATIENT)
Facility: HOSPITAL | Age: 27
LOS: 4 days | Discharge: HOME | End: 2022-12-11
Attending: EMERGENCY MEDICINE | Admitting: INTERNAL MEDICINE
Payer: MEDICAID

## 2022-12-07 ENCOUNTER — APPOINTMENT (OUTPATIENT)
Dept: GENERAL RADIOLOGY | Facility: HOSPITAL | Age: 27
End: 2022-12-07
Attending: STUDENT IN AN ORGANIZED HEALTH CARE EDUCATION/TRAINING PROGRAM
Payer: MEDICAID

## 2022-12-07 DIAGNOSIS — K56.600 PARTIAL SMALL BOWEL OBSTRUCTION (HCC): Primary | ICD-10-CM

## 2022-12-07 DIAGNOSIS — K59.04 CHRONIC IDIOPATHIC CONSTIPATION: ICD-10-CM

## 2022-12-07 PROBLEM — R79.89 AZOTEMIA: Status: ACTIVE | Noted: 2022-12-07

## 2022-12-07 PROBLEM — R73.9 HYPERGLYCEMIA: Status: ACTIVE | Noted: 2022-12-07

## 2022-12-07 LAB
ALBUMIN SERPL-MCNC: 3.7 G/DL (ref 3.4–5)
ALBUMIN/GLOB SERPL: 0.7 {RATIO} (ref 1–2)
ALP LIVER SERPL-CCNC: 65 U/L
ALT SERPL-CCNC: 14 U/L
ANION GAP SERPL CALC-SCNC: 8 MMOL/L (ref 0–18)
AST SERPL-CCNC: 9 U/L (ref 15–37)
B-HCG UR QL: NEGATIVE
BASOPHILS # BLD AUTO: 0.02 X10(3) UL (ref 0–0.2)
BASOPHILS NFR BLD AUTO: 0.2 %
BILIRUB SERPL-MCNC: 0.4 MG/DL (ref 0.1–2)
BILIRUB UR QL CFM: NEGATIVE
BUN BLD-MCNC: 19 MG/DL (ref 7–18)
CALCIUM BLD-MCNC: 9.7 MG/DL (ref 8.5–10.1)
CHLORIDE SERPL-SCNC: 105 MMOL/L (ref 98–112)
CLARITY UR REFRACT.AUTO: CLEAR
CO2 SERPL-SCNC: 23 MMOL/L (ref 21–32)
COLOR UR AUTO: YELLOW
CREAT BLD-MCNC: 0.9 MG/DL
EOSINOPHIL # BLD AUTO: 0.01 X10(3) UL (ref 0–0.7)
EOSINOPHIL NFR BLD AUTO: 0.1 %
ERYTHROCYTE [DISTWIDTH] IN BLOOD BY AUTOMATED COUNT: 13.2 %
GFR SERPLBLD BASED ON 1.73 SQ M-ARVRAT: 89 ML/MIN/1.73M2 (ref 60–?)
GLOBULIN PLAS-MCNC: 5 G/DL (ref 2.8–4.4)
GLUCOSE BLD-MCNC: 110 MG/DL (ref 70–99)
GLUCOSE UR STRIP.AUTO-MCNC: NEGATIVE MG/DL
HCT VFR BLD AUTO: 45.7 %
HGB BLD-MCNC: 14.3 G/DL
IMM GRANULOCYTES # BLD AUTO: 0.03 X10(3) UL (ref 0–1)
IMM GRANULOCYTES NFR BLD: 0.3 %
KETONES UR STRIP.AUTO-MCNC: 80 MG/DL
LEUKOCYTE ESTERASE UR QL STRIP.AUTO: NEGATIVE
LIPASE SERPL-CCNC: 68 U/L (ref 73–393)
LYMPHOCYTES # BLD AUTO: 0.94 X10(3) UL (ref 1–4)
LYMPHOCYTES NFR BLD AUTO: 9.8 %
MAGNESIUM SERPL-MCNC: 2.1 MG/DL (ref 1.6–2.6)
MCH RBC QN AUTO: 26.6 PG (ref 26–34)
MCHC RBC AUTO-ENTMCNC: 31.3 G/DL (ref 31–37)
MCV RBC AUTO: 85.1 FL
MONOCYTES # BLD AUTO: 0.55 X10(3) UL (ref 0.1–1)
MONOCYTES NFR BLD AUTO: 5.8 %
NEUTROPHILS # BLD AUTO: 8 X10 (3) UL (ref 1.5–7.7)
NEUTROPHILS # BLD AUTO: 8 X10(3) UL (ref 1.5–7.7)
NEUTROPHILS NFR BLD AUTO: 83.8 %
NITRITE UR QL STRIP.AUTO: NEGATIVE
OSMOLALITY SERPL CALC.SUM OF ELEC: 285 MOSM/KG (ref 275–295)
PH UR STRIP.AUTO: 5.5 [PH] (ref 5–8)
PLATELET # BLD AUTO: 216 10(3)UL (ref 150–450)
POTASSIUM SERPL-SCNC: 4.3 MMOL/L (ref 3.5–5.1)
PROT SERPL-MCNC: 8.7 G/DL (ref 6.4–8.2)
RBC # BLD AUTO: 5.37 X10(6)UL
RBC UR QL AUTO: NEGATIVE
SARS-COV-2 RNA RESP QL NAA+PROBE: NOT DETECTED
SODIUM SERPL-SCNC: 136 MMOL/L (ref 136–145)
SP GR UR STRIP.AUTO: >=1.03 (ref 1–1.03)
UROBILINOGEN UR STRIP.AUTO-MCNC: 1 MG/DL
WBC # BLD AUTO: 9.6 X10(3) UL (ref 4–11)

## 2022-12-07 PROCEDURE — 99223 1ST HOSP IP/OBS HIGH 75: CPT | Performed by: INTERNAL MEDICINE

## 2022-12-07 PROCEDURE — 74270 X-RAY XM COLON 1CNTRST STD: CPT | Performed by: STUDENT IN AN ORGANIZED HEALTH CARE EDUCATION/TRAINING PROGRAM

## 2022-12-07 PROCEDURE — 74177 CT ABD & PELVIS W/CONTRAST: CPT | Performed by: EMERGENCY MEDICINE

## 2022-12-07 PROCEDURE — 99254 IP/OBS CNSLTJ NEW/EST MOD 60: CPT | Performed by: STUDENT IN AN ORGANIZED HEALTH CARE EDUCATION/TRAINING PROGRAM

## 2022-12-07 RX ORDER — GUANFACINE 1 MG/1
1 TABLET ORAL EVERY EVENING
Status: ON HOLD | COMMUNITY
End: 2022-12-09

## 2022-12-07 RX ORDER — GUANFACINE 1 MG/1
2 TABLET ORAL EVERY MORNING
COMMUNITY

## 2022-12-07 RX ORDER — PROCHLORPERAZINE EDISYLATE 5 MG/ML
5 INJECTION INTRAMUSCULAR; INTRAVENOUS EVERY 8 HOURS PRN
Status: DISCONTINUED | OUTPATIENT
Start: 2022-12-07 | End: 2022-12-11

## 2022-12-07 RX ORDER — GUANFACINE 1 MG/1
1 TABLET ORAL EVERY EVENING
Status: DISCONTINUED | OUTPATIENT
Start: 2022-12-07 | End: 2022-12-09

## 2022-12-07 RX ORDER — SODIUM CHLORIDE 9 MG/ML
INJECTION, SOLUTION INTRAVENOUS CONTINUOUS
Status: ACTIVE | OUTPATIENT
Start: 2022-12-07 | End: 2022-12-07

## 2022-12-07 RX ORDER — HEPARIN SODIUM 5000 [USP'U]/ML
5000 INJECTION, SOLUTION INTRAVENOUS; SUBCUTANEOUS EVERY 8 HOURS SCHEDULED
Status: DISCONTINUED | OUTPATIENT
Start: 2022-12-07 | End: 2022-12-11

## 2022-12-07 RX ORDER — SODIUM CHLORIDE 9 MG/ML
INJECTION, SOLUTION INTRAVENOUS CONTINUOUS
Status: DISCONTINUED | OUTPATIENT
Start: 2022-12-07 | End: 2022-12-08

## 2022-12-07 RX ORDER — MORPHINE SULFATE 4 MG/ML
4 INJECTION, SOLUTION INTRAMUSCULAR; INTRAVENOUS EVERY 2 HOUR PRN
Status: DISCONTINUED | OUTPATIENT
Start: 2022-12-07 | End: 2022-12-11

## 2022-12-07 RX ORDER — GUANFACINE 1 MG/1
2 TABLET ORAL EVERY MORNING
Status: DISCONTINUED | OUTPATIENT
Start: 2022-12-07 | End: 2022-12-11

## 2022-12-07 RX ORDER — ONDANSETRON 2 MG/ML
4 INJECTION INTRAMUSCULAR; INTRAVENOUS EVERY 4 HOURS PRN
Status: DISCONTINUED | OUTPATIENT
Start: 2022-12-07 | End: 2022-12-07 | Stop reason: SDUPTHER

## 2022-12-07 RX ORDER — HYDROMORPHONE HYDROCHLORIDE 1 MG/ML
0.5 INJECTION, SOLUTION INTRAMUSCULAR; INTRAVENOUS; SUBCUTANEOUS EVERY 30 MIN PRN
Status: ACTIVE | OUTPATIENT
Start: 2022-12-07 | End: 2022-12-07

## 2022-12-07 RX ORDER — ONDANSETRON 2 MG/ML
4 INJECTION INTRAMUSCULAR; INTRAVENOUS ONCE
Status: COMPLETED | OUTPATIENT
Start: 2022-12-07 | End: 2022-12-07

## 2022-12-07 RX ORDER — ONDANSETRON 2 MG/ML
4 INJECTION INTRAMUSCULAR; INTRAVENOUS EVERY 6 HOURS PRN
Status: DISCONTINUED | OUTPATIENT
Start: 2022-12-07 | End: 2022-12-11

## 2022-12-07 RX ORDER — QUETIAPINE FUMARATE 100 MG/1
300 TABLET, FILM COATED ORAL EVERY EVENING
Status: DISCONTINUED | OUTPATIENT
Start: 2022-12-07 | End: 2022-12-11

## 2022-12-07 RX ORDER — ACETAMINOPHEN 500 MG
500 TABLET ORAL EVERY 4 HOURS PRN
Status: DISCONTINUED | OUTPATIENT
Start: 2022-12-07 | End: 2022-12-11

## 2022-12-07 RX ORDER — DIVALPROEX SODIUM 250 MG/1
250 TABLET, DELAYED RELEASE ORAL 3 TIMES DAILY
Status: DISCONTINUED | OUTPATIENT
Start: 2022-12-07 | End: 2022-12-08

## 2022-12-07 RX ORDER — MORPHINE SULFATE 2 MG/ML
1 INJECTION, SOLUTION INTRAMUSCULAR; INTRAVENOUS EVERY 2 HOUR PRN
Status: DISCONTINUED | OUTPATIENT
Start: 2022-12-07 | End: 2022-12-11

## 2022-12-07 RX ORDER — MORPHINE SULFATE 2 MG/ML
2 INJECTION, SOLUTION INTRAMUSCULAR; INTRAVENOUS EVERY 2 HOUR PRN
Status: DISCONTINUED | OUTPATIENT
Start: 2022-12-07 | End: 2022-12-11

## 2022-12-07 RX ORDER — QUETIAPINE FUMARATE 100 MG/1
100 TABLET, FILM COATED ORAL EVERY MORNING
Status: DISCONTINUED | OUTPATIENT
Start: 2022-12-07 | End: 2022-12-11

## 2022-12-07 NOTE — ED QUICK NOTES
Orders for admission, patient is aware of plan and ready to go upstairs. Any questions, please call ED RN Josefina Frank  at extension [phone]. Vaccinated? yes  Type of COVID test sent:rapid  COVID Suspicion level: Low/    Titratable drug(s) infusing:none  Rate:    LOC at time of transport:alert    Other pertinent information:high functioning autism    CIWA score=  NIH score=

## 2022-12-07 NOTE — ED INITIAL ASSESSMENT (HPI)
Per mother, patient has been experiencing constipation. Last night she had an episode of vomiting as well as this morning. Per her mother, her belly seems to be \"distended\".

## 2022-12-08 ENCOUNTER — APPOINTMENT (OUTPATIENT)
Dept: GENERAL RADIOLOGY | Facility: HOSPITAL | Age: 27
End: 2022-12-08
Attending: INTERNAL MEDICINE
Payer: MEDICAID

## 2022-12-08 LAB
ALBUMIN SERPL-MCNC: 2.7 G/DL (ref 3.4–5)
ALBUMIN/GLOB SERPL: 0.7 {RATIO} (ref 1–2)
ALP LIVER SERPL-CCNC: 46 U/L
ALT SERPL-CCNC: 12 U/L
ANION GAP SERPL CALC-SCNC: 9 MMOL/L (ref 0–18)
AST SERPL-CCNC: 13 U/L (ref 15–37)
BASOPHILS # BLD AUTO: 0.04 X10(3) UL (ref 0–0.2)
BASOPHILS NFR BLD AUTO: 0.7 %
BILIRUB SERPL-MCNC: 0.5 MG/DL (ref 0.1–2)
BUN BLD-MCNC: 14 MG/DL (ref 7–18)
CALCIUM BLD-MCNC: 8.3 MG/DL (ref 8.5–10.1)
CHLORIDE SERPL-SCNC: 108 MMOL/L (ref 98–112)
CO2 SERPL-SCNC: 19 MMOL/L (ref 21–32)
CREAT BLD-MCNC: 0.66 MG/DL
EOSINOPHIL # BLD AUTO: 0.05 X10(3) UL (ref 0–0.7)
EOSINOPHIL NFR BLD AUTO: 0.9 %
ERYTHROCYTE [DISTWIDTH] IN BLOOD BY AUTOMATED COUNT: 13.3 %
GFR SERPLBLD BASED ON 1.73 SQ M-ARVRAT: 122 ML/MIN/1.73M2 (ref 60–?)
GLOBULIN PLAS-MCNC: 3.7 G/DL (ref 2.8–4.4)
GLUCOSE BLD-MCNC: 118 MG/DL (ref 70–99)
GLUCOSE BLD-MCNC: 154 MG/DL (ref 70–99)
GLUCOSE BLD-MCNC: 52 MG/DL (ref 70–99)
GLUCOSE BLD-MCNC: 58 MG/DL (ref 70–99)
GLUCOSE BLD-MCNC: 77 MG/DL (ref 70–99)
GLUCOSE BLD-MCNC: 78 MG/DL (ref 70–99)
GLUCOSE BLD-MCNC: 99 MG/DL (ref 70–99)
HCT VFR BLD AUTO: 38.9 %
HGB BLD-MCNC: 12.1 G/DL
IMM GRANULOCYTES # BLD AUTO: 0.01 X10(3) UL (ref 0–1)
IMM GRANULOCYTES NFR BLD: 0.2 %
LYMPHOCYTES # BLD AUTO: 2.25 X10(3) UL (ref 1–4)
LYMPHOCYTES NFR BLD AUTO: 41.6 %
MCH RBC QN AUTO: 27.1 PG (ref 26–34)
MCHC RBC AUTO-ENTMCNC: 31.1 G/DL (ref 31–37)
MCV RBC AUTO: 87 FL
MONOCYTES # BLD AUTO: 0.46 X10(3) UL (ref 0.1–1)
MONOCYTES NFR BLD AUTO: 8.5 %
NEUTROPHILS # BLD AUTO: 2.6 X10 (3) UL (ref 1.5–7.7)
NEUTROPHILS # BLD AUTO: 2.6 X10(3) UL (ref 1.5–7.7)
NEUTROPHILS NFR BLD AUTO: 48.1 %
OSMOLALITY SERPL CALC.SUM OF ELEC: 280 MOSM/KG (ref 275–295)
PLATELET # BLD AUTO: 188 10(3)UL (ref 150–450)
POTASSIUM SERPL-SCNC: 4.1 MMOL/L (ref 3.5–5.1)
PROT SERPL-MCNC: 6.4 G/DL (ref 6.4–8.2)
RBC # BLD AUTO: 4.47 X10(6)UL
SODIUM SERPL-SCNC: 136 MMOL/L (ref 136–145)
WBC # BLD AUTO: 5.4 X10(3) UL (ref 4–11)

## 2022-12-08 PROCEDURE — 99232 SBSQ HOSP IP/OBS MODERATE 35: CPT | Performed by: INTERNAL MEDICINE

## 2022-12-08 PROCEDURE — 74018 RADEX ABDOMEN 1 VIEW: CPT | Performed by: INTERNAL MEDICINE

## 2022-12-08 RX ORDER — DEXTROSE AND SODIUM CHLORIDE 5; .45 G/100ML; G/100ML
INJECTION, SOLUTION INTRAVENOUS CONTINUOUS
Status: DISCONTINUED | OUTPATIENT
Start: 2022-12-08 | End: 2022-12-11

## 2022-12-08 RX ORDER — MINERAL OIL 100 G/100G
1 OIL RECTAL ONCE
Status: DISCONTINUED | OUTPATIENT
Start: 2022-12-08 | End: 2022-12-08

## 2022-12-08 RX ORDER — DEXTROSE MONOHYDRATE 25 G/50ML
50 INJECTION, SOLUTION INTRAVENOUS AS NEEDED
Status: COMPLETED | OUTPATIENT
Start: 2022-12-08 | End: 2022-12-08

## 2022-12-08 RX ORDER — DIVALPROEX SODIUM 250 MG/1
250 TABLET, DELAYED RELEASE ORAL EVERY 12 HOURS SCHEDULED
Status: DISCONTINUED | OUTPATIENT
Start: 2022-12-08 | End: 2022-12-11

## 2022-12-08 RX ORDER — MINERAL OIL 100 G/100G
1 OIL RECTAL ONCE AS NEEDED
Status: DISCONTINUED | OUTPATIENT
Start: 2022-12-08 | End: 2022-12-08

## 2022-12-08 NOTE — PROGRESS NOTES
NURSING ADMISSION NOTE      Patient admitted via Cart  Oriented to room. Safety precautions initiated. Bed in low position. Call light in reach. Pt admitted from ED for constipation, seen by Dr. Lazara Sy, SSE given with good result, explained to pt/mother poc, ivf infusing.

## 2022-12-08 NOTE — PROGRESS NOTES
Patient is alert and oriented x4. Kept NPO with IVF infusing per orders. Pt requested to eat or drink overnight, educated on orders for bowel rest at this time. Paged surgery and given ok for oral meds- given and pt tolerated oral meds with sips of water. No complaints of pain, nausea or vomiting. 1 BM soft and brown, medium at 8pm and none since at this time. Will monitor for any continued stools s/p enema administration. Call light within reach, family at bedside. Given tap water enema at 0620. Pt tolerated fairly well. Pt assisted to bathroom at 0645 and had watery stool s/p administration. Pt awaiting transport for repeat abdominal XR study.

## 2022-12-09 ENCOUNTER — APPOINTMENT (OUTPATIENT)
Dept: GENERAL RADIOLOGY | Facility: HOSPITAL | Age: 27
End: 2022-12-09
Attending: INTERNAL MEDICINE
Payer: MEDICAID

## 2022-12-09 LAB
GLUCOSE BLD-MCNC: 107 MG/DL (ref 70–99)
GLUCOSE BLD-MCNC: 84 MG/DL (ref 70–99)
GLUCOSE BLD-MCNC: 94 MG/DL (ref 70–99)

## 2022-12-09 PROCEDURE — 99232 SBSQ HOSP IP/OBS MODERATE 35: CPT | Performed by: HOSPITALIST

## 2022-12-09 PROCEDURE — 74018 RADEX ABDOMEN 1 VIEW: CPT | Performed by: INTERNAL MEDICINE

## 2022-12-09 PROCEDURE — 99232 SBSQ HOSP IP/OBS MODERATE 35: CPT | Performed by: STUDENT IN AN ORGANIZED HEALTH CARE EDUCATION/TRAINING PROGRAM

## 2022-12-09 NOTE — PLAN OF CARE
Received pt a&o x4. VSS. Afebrile. No c/o pain. NPO status maintained. Pt asking about eating, educated on current orders. Tolerated sips with meds. Enema administered per order, multiple BM this shift. Mother at bedside & aware of POC. Call light within reach. Safety precautions in place.      Problem: GASTROINTESTINAL - ADULT  Goal: Minimal or absence of nausea and vomiting  Description: INTERVENTIONS:  - Maintain adequate hydration with IV or PO as ordered and tolerated  - Nasogastric tube to low intermittent suction as ordered  - Evaluate effectiveness of ordered antiemetic medications  - Provide nonpharmacologic comfort measures as appropriate  - Advance diet as tolerated, if ordered  - Obtain nutritional consult as needed  - Evaluate fluid balance  Outcome: Progressing     Problem: GASTROINTESTINAL - ADULT  Goal: Maintains or returns to baseline bowel function  Description: INTERVENTIONS:  - Assess bowel function  - Maintain adequate hydration with IV or PO as ordered and tolerated  - Evaluate effectiveness of GI medications  - Encourage mobilization and activity  - Obtain nutritional consult as needed  - Establish a toileting routine/schedule  - Consider collaborating with pharmacy to review patient's medication profile  Outcome: Progressing     Problem: GASTROINTESTINAL - ADULT  Goal: Maintains adequate nutritional intake (undernourished)  Description: INTERVENTIONS:  - Monitor percentage of each meal consumed  - Identify factors contributing to decreased intake, treat as appropriate  - Assist with meals as needed  - Monitor I&O, WT and lab values  - Obtain nutritional consult as needed  - Optimize oral hygiene and moisture  - Encourage food from home; allow for food preferences  - Enhance eating environment  Outcome: Progressing

## 2022-12-09 NOTE — PLAN OF CARE
Tap water enema given at 1620, awaiting results. Patient with multiple large thick liquid bowel movements after soap suds enema which was given at 1515. Patient denies pain, denies nausea. Ambulates with assist to the bathroom. NPO in place, ok for sips w medications per MDs. Patient and mother at bedside updated and in agreement with POC. Patient progressing per POC. Patient w hypoglycemia in AM- page to Dr. Ryan García, orders received for D50 and D5 0.45 IVF. Glucose monitored per policy- recheck at 78, IVF increased per Dr. yRan García. Fall and safety precautions in place.

## 2022-12-09 NOTE — PLAN OF CARE
Problem: Patient/Family Goals  Goal: Patient/Family Long Term Goal  Description: Patient's Long Term Goal:     Interventions:    - See additional Care Plan goals for specific interventions  Outcome: Progressing  Goal: Patient/Family Short Term Goal  Description: Patient's Short Term Goal:    Interventions:     - See additional Care Plan goals for specific interventions  Outcome: Progressing     Problem: GASTROINTESTINAL - ADULT  Goal: Minimal or absence of nausea and vomiting  Description: INTERVENTIONS:  - Maintain adequate hydration with IV or PO as ordered and tolerated  - Nasogastric tube to low intermittent suction as ordered  - Evaluate effectiveness of ordered antiemetic medications  - Provide nonpharmacologic comfort measures as appropriate  - Advance diet as tolerated, if ordered  - Obtain nutritional consult as needed  - Evaluate fluid balance  Outcome: Progressing  Goal: Maintains or returns to baseline bowel function  Description: INTERVENTIONS:  - Assess bowel function  - Maintain adequate hydration with IV or PO as ordered and tolerated  - Evaluate effectiveness of GI medications  - Encourage mobilization and activity  - Obtain nutritional consult as needed  - Establish a toileting routine/schedule  - Consider collaborating with pharmacy to review patient's medication profile  Outcome: Progressing  Goal: Maintains adequate nutritional intake (undernourished)  Description: INTERVENTIONS:  - Monitor percentage of each meal consumed  - Identify factors contributing to decreased intake, treat as appropriate  - Assist with meals as needed  - Monitor I&O, WT and lab values  - Obtain nutritional consult as needed  - Optimize oral hygiene and moisture  - Encourage food from home; allow for food preferences  - Enhance eating environment  Outcome: Progressing     Patient tolerating CLD, soap suds enema given as ordered. Patient with 3 large BMs s/p enema.  Discussed glucose levels with Dr. Jasmin [place], per  [place], continue D5 0.45 IVF and q 6 accuchecks at this time due to previous episode of hypoglycemia on 12/8. Patient tolerating golytely- at this time patient has consumed approx 75% and is continuing to drink the remaining amount. Writer updated patient's mother Christelle via phone. Patient also updated and in agreement with POC and progressing per POC. Fall and safety precautions in place.

## 2022-12-10 ENCOUNTER — APPOINTMENT (OUTPATIENT)
Dept: GENERAL RADIOLOGY | Facility: HOSPITAL | Age: 27
End: 2022-12-10
Attending: INTERNAL MEDICINE
Payer: MEDICAID

## 2022-12-10 LAB
GLUCOSE BLD-MCNC: 104 MG/DL (ref 70–99)
GLUCOSE BLD-MCNC: 86 MG/DL (ref 70–99)

## 2022-12-10 PROCEDURE — 74018 RADEX ABDOMEN 1 VIEW: CPT | Performed by: INTERNAL MEDICINE

## 2022-12-10 PROCEDURE — 99232 SBSQ HOSP IP/OBS MODERATE 35: CPT | Performed by: SURGERY

## 2022-12-10 PROCEDURE — 99232 SBSQ HOSP IP/OBS MODERATE 35: CPT | Performed by: HOSPITALIST

## 2022-12-10 RX ORDER — LACTULOSE 10 G/15ML
20 SOLUTION ORAL 3 TIMES DAILY
Status: DISCONTINUED | OUTPATIENT
Start: 2022-12-10 | End: 2022-12-11

## 2022-12-10 RX ORDER — POLYETHYLENE GLYCOL 3350 17 G/17G
17 POWDER, FOR SOLUTION ORAL 2 TIMES DAILY
Status: DISCONTINUED | OUTPATIENT
Start: 2022-12-10 | End: 2022-12-11

## 2022-12-10 NOTE — PLAN OF CARE
Patient alert and oriented x4. Vital signs stable. Afebrile. Room air. No complaints of abdominal pain. No nausea and vomiting. Maintained on Clear Liquid diet. Constipation improving. Golytely completed. Noted by 3x loose brown stool. Up with assist to the bathroom. Fall precautions in place. Call light in reach. POC: Xray in the morning.        Problem: GASTROINTESTINAL - ADULT  Goal: Minimal or absence of nausea and vomiting  Description: INTERVENTIONS:  - Maintain adequate hydration with IV or PO as ordered and tolerated  - Nasogastric tube to low intermittent suction as ordered  - Evaluate effectiveness of ordered antiemetic medications  - Provide nonpharmacologic comfort measures as appropriate  - Advance diet as tolerated, if ordered  - Obtain nutritional consult as needed  - Evaluate fluid balance  Outcome: Progressing  Goal: Maintains or returns to baseline bowel function  Description: INTERVENTIONS:  - Assess bowel function  - Maintain adequate hydration with IV or PO as ordered and tolerated  - Evaluate effectiveness of GI medications  - Encourage mobilization and activity  - Obtain nutritional consult as needed  - Establish a toileting routine/schedule  - Consider collaborating with pharmacy to review patient's medication profile  Outcome: Progressing

## 2022-12-10 NOTE — PLAN OF CARE
Xray abd done this am- severe ileus. On RN assessment, pt denies nausea/vomiting, abd is rounded/soft, denies pain. GI advanced diet to general. Added Miralax/lactulose. Pt ate 1/3 burger and bites of soup for lunch. Pt reported belching and had BM after lunch. Had second BM this evening- large/watery. Not passing gas. Up SBA, calls appropriately. Regarding home medication Linzess: Notified pt's mother that med will need to be brought in from home; pt's mom made aware of the process for pharmacy med relabeling. Pt's mom reports she will bring in tonight.

## 2022-12-11 VITALS
HEART RATE: 78 BPM | DIASTOLIC BLOOD PRESSURE: 64 MMHG | WEIGHT: 170 LBS | OXYGEN SATURATION: 96 % | BODY MASS INDEX: 27 KG/M2 | SYSTOLIC BLOOD PRESSURE: 91 MMHG | RESPIRATION RATE: 18 BRPM | TEMPERATURE: 99 F

## 2022-12-11 LAB
ANION GAP SERPL CALC-SCNC: 6 MMOL/L (ref 0–18)
BASOPHILS # BLD AUTO: 0.03 X10(3) UL (ref 0–0.2)
BASOPHILS NFR BLD AUTO: 0.8 %
BUN BLD-MCNC: <1 MG/DL (ref 7–18)
CALCIUM BLD-MCNC: 8.5 MG/DL (ref 8.5–10.1)
CHLORIDE SERPL-SCNC: 110 MMOL/L (ref 98–112)
CO2 SERPL-SCNC: 24 MMOL/L (ref 21–32)
CREAT BLD-MCNC: 0.7 MG/DL
EOSINOPHIL # BLD AUTO: 0.07 X10(3) UL (ref 0–0.7)
EOSINOPHIL NFR BLD AUTO: 1.8 %
ERYTHROCYTE [DISTWIDTH] IN BLOOD BY AUTOMATED COUNT: 13.2 %
GFR SERPLBLD BASED ON 1.73 SQ M-ARVRAT: 120 ML/MIN/1.73M2 (ref 60–?)
GLUCOSE BLD-MCNC: 105 MG/DL (ref 70–99)
GLUCOSE BLD-MCNC: 159 MG/DL (ref 70–99)
GLUCOSE BLD-MCNC: 96 MG/DL (ref 70–99)
HCT VFR BLD AUTO: 37.2 %
HGB BLD-MCNC: 11.9 G/DL
IMM GRANULOCYTES # BLD AUTO: 0.01 X10(3) UL (ref 0–1)
IMM GRANULOCYTES NFR BLD: 0.3 %
LYMPHOCYTES # BLD AUTO: 1.96 X10(3) UL (ref 1–4)
LYMPHOCYTES NFR BLD AUTO: 51.2 %
MCH RBC QN AUTO: 27 PG (ref 26–34)
MCHC RBC AUTO-ENTMCNC: 32 G/DL (ref 31–37)
MCV RBC AUTO: 84.4 FL
MONOCYTES # BLD AUTO: 0.48 X10(3) UL (ref 0.1–1)
MONOCYTES NFR BLD AUTO: 12.5 %
NEUTROPHILS # BLD AUTO: 1.28 X10 (3) UL (ref 1.5–7.7)
NEUTROPHILS # BLD AUTO: 1.28 X10(3) UL (ref 1.5–7.7)
NEUTROPHILS NFR BLD AUTO: 33.4 %
PLATELET # BLD AUTO: 190 10(3)UL (ref 150–450)
POTASSIUM SERPL-SCNC: 3.6 MMOL/L (ref 3.5–5.1)
RBC # BLD AUTO: 4.41 X10(6)UL
SODIUM SERPL-SCNC: 140 MMOL/L (ref 136–145)
WBC # BLD AUTO: 3.8 X10(3) UL (ref 4–11)

## 2022-12-11 PROCEDURE — 99239 HOSP IP/OBS DSCHRG MGMT >30: CPT | Performed by: HOSPITALIST

## 2022-12-11 RX ORDER — POLYETHYLENE GLYCOL 3350 17 G/17G
17 POWDER, FOR SOLUTION ORAL 2 TIMES DAILY
Qty: 60 PACKET | Refills: 0 | Status: SHIPPED | OUTPATIENT
Start: 2022-12-11 | End: 2023-01-10

## 2022-12-11 RX ORDER — DIVALPROEX SODIUM 250 MG/1
250 TABLET, DELAYED RELEASE ORAL 2 TIMES DAILY
Status: SHIPPED | COMMUNITY
Start: 2022-12-11

## 2022-12-11 NOTE — PLAN OF CARE
Patient alert and oriented x4. Vital signs stable. Afebrile. Room air. No SOB. Bowel function improving. No nausea and vomiting. No complaints of abdominal pain. Miralax and Lactulose given per MAR. Episodes of bowel movements noted. Regular diet tolerated well. Ambulatory. Fall precautions in place. Call light in reach. Needs attended.        Problem: GASTROINTESTINAL - ADULT  Goal: Maintains or returns to baseline bowel function  Description: INTERVENTIONS:  - Assess bowel function  - Maintain adequate hydration with IV or PO as ordered and tolerated  - Evaluate effectiveness of GI medications  - Encourage mobilization and activity  - Obtain nutritional consult as needed  - Establish a toileting routine/schedule  - Consider collaborating with pharmacy to review patient's medication profile  Outcome: Progressing

## 2022-12-20 NOTE — PAYOR COMM NOTE
Discharge Notification    Patient Name: Regine Bagley  Payor: Deven Vasquezanne #: [id number]  Authorization Number: Skyla Olivier Date/Time: 12/7/2022 6:03 AM  Discharge Date/Time: 12/11/2022 6:00 PM

## 2023-01-05 RX ORDER — LINACLOTIDE 72 UG/1
CAPSULE, GELATIN COATED ORAL
Qty: 30 CAPSULE | Refills: 3 | Status: SHIPPED | OUTPATIENT
Start: 2023-01-05

## 2023-02-06 ENCOUNTER — PATIENT MESSAGE (OUTPATIENT)
Dept: INTERNAL MEDICINE CLINIC | Facility: CLINIC | Age: 28
End: 2023-02-06

## 2023-02-07 ENCOUNTER — PATIENT MESSAGE (OUTPATIENT)
Dept: INTERNAL MEDICINE CLINIC | Facility: CLINIC | Age: 28
End: 2023-02-07

## 2023-02-07 NOTE — TELEPHONE ENCOUNTER
OFFICE STAFF=please assist,thanks     Telemedicine 6/13/22; Last physical 2/22/2021. No future appointments. From: Vicente Card  To: Faith Maria MD  Sent: 2/6/2023  3:58 PM CST  Subject: [address] Form    Good afternoon Aiden, I pray all has been well. This is Adebayo mom,  Yaneth each year she has a camp form that needs to be completed and I sent over the last form from last year and they need it to be updated for 2023. If you could please complete the attached document for [facility] so that I can get Adebayo registered. I would definitely hope that she can get this done by tomorrow if all possible. Camp registration closing in couple days. Please if you can upload in her chart.

## 2023-02-07 NOTE — TELEPHONE ENCOUNTER
Pt's mother has been informed that form is ready, she will call back to provide fax number so we can send.

## 2023-04-12 RX ORDER — QUETIAPINE FUMARATE 100 MG/1
100 TABLET, FILM COATED ORAL DAILY
Qty: 90 TABLET | Refills: 3 | Status: SHIPPED | OUTPATIENT
Start: 2023-04-12

## 2023-04-12 RX ORDER — FERROUS SULFATE 325(65) MG
325 TABLET ORAL
Qty: 90 TABLET | Refills: 3 | Status: SHIPPED | OUTPATIENT
Start: 2023-04-12

## 2023-04-12 NOTE — TELEPHONE ENCOUNTER
Please review; no protocol  LOV 6/13/22  Medication pended for your review and approval.     Requested Prescriptions   Pending Prescriptions Disp Refills    FEROSUL 325 (65 Fe) MG Oral Tab [Pharmacy Med Name: FERROUS SULFATE 325MG TAB] 30 tablet 10     Sig: TAKE 1 TABLET BY MOUTH EVERY DAY W/BREAKFAST :IRON SE:CONSTIPATION 03/01/22       There is no refill protocol information for this order       QUETIAPINE 100 MG Oral Tab [Pharmacy Med Name: QUETIAPINE FUMARATE 100MG TAB] 90 tablet 0     Sig: TAKE 1 TABLET (100 MG TOTAL) BY MOUTH DAILY 04/12/22       There is no refill protocol information for this order

## 2023-05-10 RX ORDER — DIVALPROEX SODIUM 250 MG/1
TABLET, DELAYED RELEASE ORAL
Qty: 270 TABLET | Refills: 1 | Status: SHIPPED | OUTPATIENT
Start: 2023-05-10

## 2023-05-10 RX ORDER — GUANFACINE 1 MG/1
TABLET ORAL
Qty: 90 TABLET | Refills: 3 | Status: SHIPPED | OUTPATIENT
Start: 2023-05-10

## 2023-05-10 NOTE — TELEPHONE ENCOUNTER
Please review. Protocol failed / No protocol. Please advise, last prescribed in ER 12/2022. Thank you.     Requested Prescriptions   Pending Prescriptions Disp Refills    GUANFACINE 1 MG Oral Tab [Pharmacy Med Name: GUANFACINE 1MG TAB] 90 tablet 3     Sig: TAKE TWO TABLETS BY MOUTH IN THE MORNING AND 1 TABLET AT 5 IN THE EVENING 10/5/22       Hypertensive Medications Protocol Failed - 5/10/2023 12:05 PM        Failed - In person appointment in the past 12 or next 3 months     Recent Outpatient Visits              3 months ago Constipation, unspecified constipation type    [facility],  Adele Hernandez, [address] [facility], DO    Office Visit    11 months ago MARISID-Daisha Valle, [address] [facility],[address], KirbyShiva MD    Telemedicine    1 year ago Constipation, unspecified constipation type    [facility], [place] [address], [address] [place] [address], [facility]    Office Visit    1 year ago Constipation, unspecified constipation type    Niels Baugh MD    Office Visit    2 years ago Annual physical exam    Niels Baugh MD    Office Visit          Future Appointments         Provider Department Appt Notes    In 5 days MD Kortney Go, [address] [facility] NP, physical and establish care*               Failed - In person appointment or virtual visit in the past 6 months     Recent Outpatient Visits              3 months ago Constipation, unspecified constipation type    [facility],  DEE DEE Cobos, DO    Office Visit    11 months ago COVID-Daisha Kenny, Shiva Shanks MD    Telemedicine    1 year ago Constipation, unspecified constipation type    [facility], [place] [address], [address] [place] [address], APR    Office Visit    1 year ago Constipation, unspecified constipation type    [address], [address], [place], MD Shiva    Office Visit    2 years ago Annual physical exam    [address] [facility], Graham Felton MD    Office Visit          Future Appointments         Provider Department Appt Notes    In 5 days Roxy Obregon MD [facility], [place] [address], [facility] NP, physical and establish care*               Passed - Last BP reading less than 140/90     BP Readings from Last 1 Encounters:  01/23/23 : 104/68                Passed - CMP or BMP in past 6 months     Recent Results (from the past 4392 hour(s))   Basic Metabolic Panel (8)    Collection Time: 12/11/22  6:54 AM   Result Value Ref Range    Glucose 159 (H) 70 - 99 mg/dL    Sodium 140 136 - 145 mmol/L    Potassium 3.6 3.5 - 5.1 mmol/L    Chloride 110 98 - 112 mmol/L    CO2 24.0 21.0 - 32.0 mmol/L    Anion Gap 6 0 - 18 mmol/L    BUN <1 (L) 7 - 18 mg/dL    Creatinine 0.70 0.55 - 1.02 mg/dL    Calcium, Total 8.5 8.5 - 10.1 mg/dL    eGFR-Cr 120 >=60 mL/min/1.73m2     *Note: Due to a large number of results and/or encounters for the requested time period, some results have not been displayed. A complete set of results can be found in Results Review. Passed - EGFRCR or GFRAA > 50     GFR Evaluation  EGFRCR: 120 , resulted on 12/11/2022              DIVALPROEX  MG Oral Tab EC DR tab [Pharmacy Med Name: DIVALPROEX SOD 250MG DR TAB] 270 tablet 1     Sig: TAKE 1 TABLET (250 MG TOTAL) BY MOUTH THREE TIMES DAILY.  04/13/22       Neurology Medications Failed - 5/10/2023 12:05 PM        Failed - In person appointment or virtual visit in the past 6 mos or appointment in next 3 mos     Recent Outpatient Visits              3 months ago Constipation, unspecified constipation type    [facility],  [facility], [address] [facility],     Office Visit    11 months ago COVID-19    [address] [facility] Simona Hopper MD    Telemedicine    1 year ago Constipation, unspecified constipation type    [facility], [address], [facility] Estrellita Postal, APRN    Office Visit    1 year ago Constipation, unspecified constipation type    Patsy Simmons MD    Office Visit    2 years ago Annual physical exam    Patsy Simmons MD    Office Visit          Future Appointments         Provider Department Appt Notes    In 5 days Dagoberto Villarreal MD [employer], [address] [place] [address] NP, physical and establish care*                    Recent Outpatient Visits              3 months ago Constipation, unspecified constipation type    [facility],  [facility] Hina,     Office Visit    11 months ago COVID-19    Adela Wallace, Shiva Shanks MD    Telemedicine    1 year ago Constipation, unspecified constipation type    [facility], [address], [facility] Estrellita Postal, APRN    Office Visit    1 year ago Constipation, unspecified constipation type    Patsy Simmons MD    Office Visit    2 years ago Annual physical exam    Patsy Simmons MD    Office Visit            Future Appointments         Provider Department Appt Notes    In 5 days Dagoberto Villarreal MD [employer], [address] [place] [address] NP, physical and establish care*

## 2023-05-15 ENCOUNTER — LAB ENCOUNTER (OUTPATIENT)
Dept: LAB | Age: 28
End: 2023-05-15
Attending: FAMILY MEDICINE
Payer: MEDICAID

## 2023-05-15 ENCOUNTER — OFFICE VISIT (OUTPATIENT)
Dept: FAMILY MEDICINE CLINIC | Facility: CLINIC | Age: 28
End: 2023-05-15
Payer: MEDICAID

## 2023-05-15 VITALS — HEART RATE: 97 BPM | SYSTOLIC BLOOD PRESSURE: 96 MMHG | DIASTOLIC BLOOD PRESSURE: 58 MMHG | OXYGEN SATURATION: 98 %

## 2023-05-15 DIAGNOSIS — Z13.21 SCREENING FOR ENDOCRINE, NUTRITIONAL, METABOLIC AND IMMUNITY DISORDER: ICD-10-CM

## 2023-05-15 DIAGNOSIS — Z13.29 SCREENING FOR ENDOCRINE, NUTRITIONAL, METABOLIC AND IMMUNITY DISORDER: ICD-10-CM

## 2023-05-15 DIAGNOSIS — Z00.00 ROUTINE GENERAL MEDICAL EXAMINATION AT A HEALTH CARE FACILITY: Primary | ICD-10-CM

## 2023-05-15 DIAGNOSIS — Z13.0 SCREENING FOR ENDOCRINE, NUTRITIONAL, METABOLIC AND IMMUNITY DISORDER: ICD-10-CM

## 2023-05-15 DIAGNOSIS — Z13.228 SCREENING FOR ENDOCRINE, NUTRITIONAL, METABOLIC AND IMMUNITY DISORDER: ICD-10-CM

## 2023-05-15 PROBLEM — F84.0 AUTISM (HCC): Status: RESOLVED | Noted: 2017-01-17 | Resolved: 2023-05-15

## 2023-05-15 PROBLEM — R73.9 HYPERGLYCEMIA: Status: RESOLVED | Noted: 2022-12-07 | Resolved: 2023-05-15

## 2023-05-15 PROBLEM — F84.0 AUTISM: Status: RESOLVED | Noted: 2017-01-17 | Resolved: 2023-05-15

## 2023-05-15 PROBLEM — R32 INCONTINENCE OF URINE: Status: RESOLVED | Noted: 2017-04-04 | Resolved: 2023-05-15

## 2023-05-15 PROBLEM — R79.89 AZOTEMIA: Status: RESOLVED | Noted: 2022-12-07 | Resolved: 2023-05-15

## 2023-05-15 PROBLEM — K56.600 PARTIAL SMALL BOWEL OBSTRUCTION (HCC): Status: RESOLVED | Noted: 2022-12-07 | Resolved: 2023-05-15

## 2023-05-15 PROBLEM — K59.00 CONSTIPATION, UNSPECIFIED CONSTIPATION TYPE: Status: RESOLVED | Noted: 2019-12-04 | Resolved: 2023-05-15

## 2023-05-15 PROBLEM — K59.00 CONSTIPATION: Status: RESOLVED | Noted: 2019-12-04 | Resolved: 2023-05-15

## 2023-05-15 PROBLEM — L20.89 OTHER ATOPIC DERMATITIS: Status: RESOLVED | Noted: 2017-09-05 | Resolved: 2023-05-15

## 2023-05-15 PROBLEM — K59.00 OBSTIPATION: Status: RESOLVED | Noted: 2019-12-04 | Resolved: 2023-05-15

## 2023-05-15 LAB
ALBUMIN SERPL-MCNC: 3.6 G/DL (ref 3.4–5)
ALBUMIN/GLOB SERPL: 0.9 {RATIO} (ref 1–2)
ALP LIVER SERPL-CCNC: 55 U/L
ALT SERPL-CCNC: 14 U/L
ANION GAP SERPL CALC-SCNC: 5 MMOL/L (ref 0–18)
AST SERPL-CCNC: 14 U/L (ref 15–37)
BASOPHILS # BLD AUTO: 0.05 X10(3) UL (ref 0–0.2)
BASOPHILS NFR BLD AUTO: 1.1 %
BILIRUB SERPL-MCNC: 0.3 MG/DL (ref 0.1–2)
BUN BLD-MCNC: 11 MG/DL (ref 7–18)
CALCIUM BLD-MCNC: 9.2 MG/DL (ref 8.5–10.1)
CHLORIDE SERPL-SCNC: 107 MMOL/L (ref 98–112)
CHOLEST SERPL-MCNC: 180 MG/DL (ref ?–200)
CO2 SERPL-SCNC: 24 MMOL/L (ref 21–32)
CREAT BLD-MCNC: 1.09 MG/DL
EOSINOPHIL # BLD AUTO: 0.05 X10(3) UL (ref 0–0.7)
EOSINOPHIL NFR BLD AUTO: 1.1 %
ERYTHROCYTE [DISTWIDTH] IN BLOOD BY AUTOMATED COUNT: 14.3 %
FASTING PATIENT LIPID ANSWER: YES
FASTING STATUS PATIENT QL REPORTED: YES
GFR SERPLBLD BASED ON 1.73 SQ M-ARVRAT: 70 ML/MIN/1.73M2 (ref 60–?)
GLOBULIN PLAS-MCNC: 3.9 G/DL (ref 2.8–4.4)
GLUCOSE BLD-MCNC: 100 MG/DL (ref 70–99)
HCT VFR BLD AUTO: 41.1 %
HDLC SERPL-MCNC: 61 MG/DL (ref 40–59)
HGB BLD-MCNC: 12.8 G/DL
IMM GRANULOCYTES # BLD AUTO: 0.01 X10(3) UL (ref 0–1)
IMM GRANULOCYTES NFR BLD: 0.2 %
LDLC SERPL CALC-MCNC: 108 MG/DL (ref ?–100)
LYMPHOCYTES # BLD AUTO: 1.72 X10(3) UL (ref 1–4)
LYMPHOCYTES NFR BLD AUTO: 39 %
MCH RBC QN AUTO: 26.3 PG (ref 26–34)
MCHC RBC AUTO-ENTMCNC: 31.1 G/DL (ref 31–37)
MCV RBC AUTO: 84.6 FL
MONOCYTES # BLD AUTO: 0.43 X10(3) UL (ref 0.1–1)
MONOCYTES NFR BLD AUTO: 9.8 %
NEUTROPHILS # BLD AUTO: 2.15 X10 (3) UL (ref 1.5–7.7)
NEUTROPHILS # BLD AUTO: 2.15 X10(3) UL (ref 1.5–7.7)
NEUTROPHILS NFR BLD AUTO: 48.8 %
NONHDLC SERPL-MCNC: 119 MG/DL (ref ?–130)
OSMOLALITY SERPL CALC.SUM OF ELEC: 281 MOSM/KG (ref 275–295)
PLATELET # BLD AUTO: 216 10(3)UL (ref 150–450)
POTASSIUM SERPL-SCNC: 4.5 MMOL/L (ref 3.5–5.1)
PROT SERPL-MCNC: 7.5 G/DL (ref 6.4–8.2)
RBC # BLD AUTO: 4.86 X10(6)UL
SODIUM SERPL-SCNC: 136 MMOL/L (ref 136–145)
TRIGL SERPL-MCNC: 56 MG/DL (ref 30–149)
TSI SER-ACNC: 1.53 MIU/ML (ref 0.36–3.74)
VIT B12 SERPL-MCNC: 270 PG/ML (ref 193–986)
VIT D+METAB SERPL-MCNC: 31.9 NG/ML (ref 30–100)
VLDLC SERPL CALC-MCNC: 9 MG/DL (ref 0–30)
WBC # BLD AUTO: 4.4 X10(3) UL (ref 4–11)

## 2023-05-15 PROCEDURE — 3074F SYST BP LT 130 MM HG: CPT | Performed by: FAMILY MEDICINE

## 2023-05-15 PROCEDURE — 99385 PREV VISIT NEW AGE 18-39: CPT | Performed by: FAMILY MEDICINE

## 2023-05-15 PROCEDURE — 82306 VITAMIN D 25 HYDROXY: CPT

## 2023-05-15 PROCEDURE — 3078F DIAST BP <80 MM HG: CPT | Performed by: FAMILY MEDICINE

## 2023-05-15 RX ORDER — LINACLOTIDE 290 UG/1
290 CAPSULE, GELATIN COATED ORAL EVERY MORNING
COMMUNITY
Start: 2023-02-25

## 2023-05-15 NOTE — PATIENT INSTRUCTIONS
Prunelax for constipation      [facility]    Dr. Cesar Carter for constipation.      [address]  [facility]:[phone]  [facility]:[phone]             1) [facility] (therapy and psychiatry) @ [phone]    2) [facility] (therapy and psychiatry) @ [phone] [facility] (therapy/psychiatry) @ [phone]    4) [address] @ [phone]

## 2023-07-06 RX ORDER — ERGOCALCIFEROL 1.25 MG/1
50000 CAPSULE ORAL WEEKLY
Qty: 4 CAPSULE | Refills: 10 | OUTPATIENT
Start: 2023-07-06

## 2023-07-07 RX ORDER — ERGOCALCIFEROL 1.25 MG/1
50000 CAPSULE ORAL WEEKLY
Qty: 4 CAPSULE | Refills: 0 | Status: SHIPPED | OUTPATIENT
Start: 2023-07-07 | End: 2023-08-06

## 2023-07-07 RX ORDER — QUETIAPINE FUMARATE 300 MG/1
TABLET, FILM COATED ORAL
Qty: 90 TABLET | Refills: 1 | OUTPATIENT
Start: 2023-07-07

## 2023-07-07 NOTE — TELEPHONE ENCOUNTER
Gladis Conroy MD   to [facility] Clinical Staff   LP    7/6/23  6:13 PM  [id number] [address] Pradhan for a refill

## 2023-08-24 NOTE — TELEPHONE ENCOUNTER
Last Prescription 1 year ago. And reported on 5/15/23, discontinued.      Declining Refill  Advising to call office (message to pharmacy)

## 2023-08-24 NOTE — TELEPHONE ENCOUNTER
Requested Renewals     Name from pharmacy: [address] [place] [address] 0.1% OINT         Will file in chart as: TRIAMCINOLONE 0.1 % External Ointment    The original prescription was discontinued on 5/15/2023 by Sherrie Alvarado MA for the following reason: Therapy completed. Renewing this prescription may not be appropriate. Sig: APPLY 1 APPLICATION BY TOPPICALLY TWICE A DAY 08/09/22    Disp: 454 g    Refills: 9    Start: 8/23/2023    Class: Normal    Last ordered: 1 year ago by Yocasta Adams. Hari Trujillo MD Last refill: 6/26/2023    Rx #: [id number]       To be filled at:  [address] [place] [address] [phone], [phone]

## 2023-09-13 RX ORDER — QUETIAPINE FUMARATE 300 MG/1
TABLET, FILM COATED ORAL
Qty: 90 TABLET | Refills: 1 | OUTPATIENT
Start: 2023-09-13

## 2023-09-18 ENCOUNTER — TELEPHONE (OUTPATIENT)
Dept: FAMILY MEDICINE CLINIC | Facility: CLINIC | Age: 28
End: 2023-09-18

## 2023-09-18 NOTE — TELEPHONE ENCOUNTER
Patient needs a referral to see a psychiatrist. Patient saw Dr. Will Stevens in may and mom was told she would have to see a psychiatrist to manage medications. Please Advise. Thank you.

## 2023-10-06 RX ORDER — ERGOCALCIFEROL 1.25 MG/1
50000 CAPSULE ORAL WEEKLY
Qty: 4 CAPSULE | Refills: 0 | OUTPATIENT
Start: 2023-10-06 | End: 2023-11-05

## 2023-10-06 RX ORDER — GUANFACINE 1 MG/1
TABLET ORAL
Qty: 90 TABLET | Refills: 2 | OUTPATIENT
Start: 2023-10-06

## 2023-10-06 NOTE — TELEPHONE ENCOUNTER
Per chart pt has new PCP.    Requested Prescriptions   Pending Prescriptions Disp Refills    ERGOCALCIFEROL 1.25 MG (97431 UT) Oral Cap [Pharmacy Med Name: VITAMIN D2 1.25MG CAP] 4 capsule 0     Sig: Take 1 capsule (50,000 Units total) by mouth once a week.       There is no refill protocol information for this order       GUANFACINE 1 MG Oral Tab [Pharmacy Med Name: GUANFACINE 1MG TAB] 90 tablet 2     Sig: TAKE TWO TABLETS BY MOUTH IN THE MORNING AND 1 TABLET AT 5 IN THE EVENING 05/10/23       Hypertensive Medications Protocol Passed - 10/4/2023  1:16 PM        Passed - In person appointment in the past 12 or next 3 months     Recent Outpatient Visits              4 months ago Routine general medical examination at a health care facility    [address] Lizeth Mart MD    Office Visit    8 months ago Constipation, unspecified constipation type    [facility],  [facility]    Office Visit    1 year ago COVID-19    [facility] Sarah Clinton [facility]    Telemedicine    1 year ago Constipation, unspecified constipation type    [facility] Zarina Nassar APRN    Office Visit    1 year ago Constipation, unspecified constipation type    [facility], Sarah Clinton MD    Office Visit                      Passed - Last BP reading less than 140/90     BP Readings from Last 1 Encounters:  05/15/23 : 96/58              Passed - CMP or BMP in past 6 months     Recent Results (from the past 4392 hour(s))   Comp Metabolic Panel (14)    Collection Time: 05/15/23 11:36 AM   Result Value Ref Range    Glucose 100 (H) 70 - 99 mg/dL    Sodium 136 136 - 145 mmol/L    Potassium 4.5 3.5 - 5.1 mmol/L    Chloride 107 98 - 112 mmol/L    CO2 24.0 21.0 - 32.0 mmol/L    Anion Gap 5 0 - 18 mmol/L    BUN 11 7 - 18 mg/dL    Creatinine 1.09 (H) 0.55  - 1.02 mg/dL    Calcium, Total 9.2 8.5 - 10.1 mg/dL    Calculated Osmolality 281 275 - 295 mOsm/kg    eGFR-Cr 70 >=60 mL/min/1.73m2    AST 14 (L) 15 - 37 U/L    ALT 14 13 - 56 U/L    Alkaline Phosphatase 55 37 - 98 U/L    Bilirubin, Total 0.3 0.1 - 2.0 mg/dL    Total Protein 7.5 6.4 - 8.2 g/dL    Albumin 3.6 3.4 - 5.0 g/dL    Globulin  3.9 2.8 - 4.4 g/dL    A/G Ratio 0.9 (L) 1.0 - 2.0    Patient Fasting for CMP? Yes      *Note: Due to a large number of results and/or encounters for the requested time period, some results have not been displayed. A complete set of results can be found in Results Review.               Passed - In person appointment or virtual visit in the past 6 months     Recent Outpatient Visits              4 months ago Routine general medical examination at a health care facility    [address] [place] Lizeth Mart MD    Office Visit    8 months ago Constipation, unspecified constipation type    [facility],  [facility] [employer], DO    Office Visit    1 year ago [facility]    [facility]    1 year ago Constipation, unspecified constipation type    [facility] Zarina Nassar APRN    Office Visit    1 year ago Constipation, unspecified constipation type    [facility]    [facility]                      Passed - EGFRCR or GFRAA > 50     GFR Evaluation  EGFRCR: 70 , resulted on 5/15/2023                Recent Outpatient Visits              4 months ago Routine general medical examination at a health care facility    [address] Lizeth Bedoya MD    Office Visit    8 months ago Constipation, unspecified constipation type    [facility],  [facility] [employer], DO    Office Visit    1 year ago COVID-19     [facility]    Telemedicine    1 year ago Constipation, unspecified constipation type    [facility] Zarina Nassar APRN    Office Visit    1 year ago Constipation, unspecified constipation type    [facility], Sarah Clinton MD    Office Visit

## 2023-10-09 ENCOUNTER — TELEPHONE (OUTPATIENT)
Dept: FAMILY MEDICINE CLINIC | Facility: CLINIC | Age: 28
End: 2023-10-09

## 2023-10-09 RX ORDER — GUANFACINE 1 MG/1
TABLET ORAL
Qty: 90 TABLET | Refills: 5 | Status: SHIPPED | OUTPATIENT
Start: 2023-10-09

## 2023-10-09 RX ORDER — ERGOCALCIFEROL 1.25 MG/1
50000 CAPSULE ORAL WEEKLY
Qty: 12 CAPSULE | Refills: 0 | Status: SHIPPED | OUTPATIENT
Start: 2023-10-09 | End: 2023-12-26

## 2023-10-09 NOTE — TELEPHONE ENCOUNTER
Requesting refill on vitamin D and guanfacine. LOV and first OV on 5/15/2023. Vitamin D last checked on 5/15/2023 and was 31.9. Please advise on the refills.

## 2023-10-10 RX ORDER — QUETIAPINE FUMARATE 300 MG/1
300 TABLET, FILM COATED ORAL NIGHTLY
Qty: 90 TABLET | Refills: 1 | Status: SHIPPED | OUTPATIENT
Start: 2023-10-10

## 2023-10-10 NOTE — TELEPHONE ENCOUNTER
Medication(s) to Refill:   Requested Prescriptions     Pending Prescriptions Disp Refills    QUETIAPINE 300 MG Oral Tab [Pharmacy Med Name: QUETIAPINE FUMARATE 300MG TAB] 90 tablet 1     Sig: TAKE ONE TABLET BY MOUTH DAILY AT BEDTIME : MOOD SE: DROWSINESS 04/12/23         Reason for Medication Refill being sent to Provider / Reason Protocol Failed:  [] 90 day refill has already been granted  [] Blood Pressure out of range  [] Labs Abnormal/over due  [] Medication not previously prescribed by Provider  [x] Non-Protocol Medication  [] Controlled Substance   [] Due for appointment- no future appointment scheduled  [] No Follow up specified      Last Time Medication was Filled:  6/10/2023      Last Office Visit with PCP: 5/15/2023    When Patient was Due Back to the Office:  one year   (from when PCP last addressed condition)    Future Appointments:  No future appointments.       Last Blood Pressures:  BP Readings from Last 2 Encounters:  05/15/23 : 96/58  01/23/23 : 104/68      Action taken:  [] Refill approved per protocol  [x] Routing to provider for approval

## 2023-11-07 RX ORDER — TRIAMCINOLONE ACETONIDE 1 MG/G
OINTMENT TOPICAL
Qty: 454 G | Refills: 9 | OUTPATIENT
Start: 2023-11-07

## 2023-11-07 NOTE — TELEPHONE ENCOUNTER
Routed to [facility] 17 Clinical Staff  for assistance, thanks. Rx ref request was sent to FM/ IM dept by mistake.            Requested Prescriptions   Pending Prescriptions Disp Refills    TRIAMCINOLONE 0.1 % External Ointment [Pharmacy Med Name: TRIAMCINOLONE ACETONIDE 0.1% OINT] 454 g 9     Sig: APPLY 1 APPLICATION BY TOPPICALLY TWICE A DAY 08/09/22       There is no refill protocol information for this order

## 2023-11-07 NOTE — TELEPHONE ENCOUNTER
Last ordered: 1 year ago (8/9/2022) by Emily Banuelos. Pablo Holloway MD     Never prescribed by PCP     Patient should make appointment for evaluation for treatment.

## 2024-01-03 RX ORDER — ERGOCALCIFEROL 1.25 MG/1
50000 CAPSULE ORAL WEEKLY
Qty: 12 CAPSULE | Refills: 1 | OUTPATIENT
Start: 2024-01-03

## 2024-01-30 NOTE — TELEPHONE ENCOUNTER
Dr. Mike Olivares--    Attempted to explain referral entry [id number] [address] has the ability to accommodate sooner appointment, if medically necessary. Patient failed to listen to what clinical staff was explaining & hung up the phone. Is anxious & feels MD did not provide the correct instructions / referrals s/p colonoscopy. Please follow up as she does not want to speak with RN staff. Thank you. IMAGING  CT Lumbar Spine  CT Pelvis  IMPRESSION:  1. No acute lumbar spine fracture or traumatic spondylolisthesis.  2. Acute nondisplaced fracture of the posteromedial aspect of the right greater trochanter. If clinically warranted, a nonemergent follow-up MRI of the right hip could be considered for further evaluation.    CT Head  CT C-Spine  IMPRESSION:  1. No acute intracranial hemorrhage, territorial infarct, mass effect or calvarial fracture.  2. Multilevel degenerative changes of the cervical spine without evidence of a fracture.    Chest X-Ray  Pending official read; on my read, no infiltrate, consolidation, or effusion noted.    EKG  Normal Sinus Rhythm, 75 bpm, QTc 410ms, NM 172ms, on my read no ST segment elevation or depression, no TWI    HPI  74 year old male patient with pmhx leukemia in remission, GERD, Dementia, Vertebral Compression Fractures of T12, L2 and L4 who presented to the ER due to a fall last week.  He came to the ER where he was diagnosed with a T12 compression fracture and discharged; however, when he got home he fell again and was in severe back and pelvic pain and unable to get up. In the ER, CT Pelvis found a nondisplaced fracture of the posteromedial aspect of the right greater trochanter.    Review Of Systems included: + fall, + back and pelvic pain, + constipation (last BM 4 days ago),   no loss of consciousness, no lightheadedness, no dizziness, no loss of appetite, no chest pain, no shortness of breath, no chest pain on exertion, no diarrhea    Physical Exam  General: Awake, Alert, Oriented  Cardiac: RRR  Pulmonary: CTA b/l  Abdominal: Soft, ND, NT    A/P  # Severe Intractable Back and Pelvis Pain  # Ambulatory Dysfunction  # Fracture of Right Greater Trochanter  - Consult Orthopedics  - Consult Pain Medicine  - PT Evaluation  - Fall Precautions  - Tylenol prn for mild pain, Oxycodone prn for moderate pain, IV Morphine prn for severe pain  - Miralax    # Hx of GERD  - Continue home medication Omeprazole    # DVT PPx  - Heparin    # FEN  - Gentle IV Fluid Hydration  - Monitor and replete electrolytes as needed  - Kosher DASH Diet    Previous Admissions in Past 3 Years Included  1/24/2023: ER Visit for back pain s/p fall after his legs became weak. Acute upper respiratory infection.  4/8/2023 - 4/11/2023: Sepsis 2/2 PNA. Entero/Rhinovirus on RVP.  2/4/2021: Open repair of inguinal hernia using mesh  1/26/2020 - 1/30/2020: Back pain worsened after fall. Inability to ambulate. 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 Neurology recommended no additional work up as patient is asymptomatic. MRI showed Interval appearance of compression deformities of T12, L2 and L4 with vertebral body marrow edema, no compression of the conus or descending nerve roots. Neurosurgery consulted, recommended brace, PT, up mobilization and pain control.    Patient case and management was discussed with ER Attending  I did examine all labs and imaging IMAGING  CT Lumbar Spine  CT Pelvis  IMPRESSION:  1. No acute lumbar spine fracture or traumatic spondylolisthesis.  2. Acute nondisplaced fracture of the posteromedial aspect of the right greater trochanter. If clinically warranted, a nonemergent follow-up MRI of the right hip could be considered for further evaluation.    CT Head  CT C-Spine  IMPRESSION:  1. No acute intracranial hemorrhage, territorial infarct, mass effect or calvarial fracture.  2. Multilevel degenerative changes of the cervical spine without evidence of a fracture.    Chest X-Ray  Pending official read; on my read, no infiltrate, consolidation, or effusion noted.    EKG  Normal Sinus Rhythm, 75 bpm, QTc 410ms, ME 172ms, on my read no ST segment elevation or depression, no TWI    HPI  74 year old male patient with pmhx leukemia in remission, GERD, Dementia, Vertebral Compression Fractures of T12, L2 and L4 who presented to the ER due to a fall last week.  He came to the ER where he was diagnosed with a T12 compression fracture and discharged; however, when he got home he fell again and was in severe back and pelvic pain and unable to get up. In the ER, CT Pelvis found a nondisplaced fracture of the posteromedial aspect of the right greater trochanter.    Review Of Systems included: + fall, + back and pelvic pain, + constipation (last BM 4 days ago),   no loss of consciousness, no lightheadedness, no dizziness, no loss of appetite, no chest pain, no shortness of breath, no chest pain on exertion, no diarrhea    Physical Exam  General: Awake, Alert, Oriented  Cardiac: RRR  Pulmonary: CTA b/l  Abdominal: Soft, ND, NT    A/P  # Severe Intractable Back and Pelvis Pain  # Ambulatory Dysfunction  # Fracture of Right Greater Trochanter  - Consult Orthopedics  - Consult Pain Medicine  - PT Evaluation  - Fall Precautions  - Tylenol prn for mild pain, Oxycodone prn for moderate pain, IV Morphine prn for severe pain  - Miralax    # Hx of GERD  - Continue home medication Omeprazole    # DVT PPx  - Heparin    # FEN  - Gentle IV Fluid Hydration  - Monitor and replete electrolytes as needed  - Kosher DASH Diet    Previous Admissions Included  1/24/2023: ER Visit for back pain s/p fall after his legs became weak. Acute upper respiratory infection.  4/8/2023 - 4/11/2023: Sepsis 2/2 PNA. Entero/Rhinovirus on RVP.  2/4/2021: Open repair of inguinal hernia using mesh  1/26/2020 - 1/30/2020: Back pain worsened after fall. Inability to ambulate. 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 Neurology recommended no additional work up as patient is asymptomatic. MRI showed Interval appearance of compression deformities of T12, L2 and L4 with vertebral body marrow edema, no compression of the conus or descending nerve roots. Neurosurgery consulted, recommended brace, PT, up mobilization and pain control.  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 States that he received transfusions 1x/week. He has been on 60mg of prednisone since February. IVIG stopped 3 weeks ago, prednisone was tapered down to 40 mg 3 days ago. CT of the C/A/P done which did not reveal any adenopathy, BM biopsy flow showed NK cells, started on Viread 300mg PO daily, received one unit PRBC prior to discharge and discharged home with oupatient followup.  6/23/2019: ER Visit for generalized weakness and fatigue. Patient refused hospital admission.  2/24/2019 - 2/27/2019: Sepsis 2/2 PNA. Influenza    Patient case and management was discussed with ER Attending  I did examine all labs and imaging

## 2024-02-05 ENCOUNTER — VIRTUAL PHONE E/M (OUTPATIENT)
Dept: FAMILY MEDICINE CLINIC | Facility: CLINIC | Age: 29
End: 2024-02-05
Payer: MEDICAID

## 2024-02-05 DIAGNOSIS — N93.8 DUB (DYSFUNCTIONAL UTERINE BLEEDING): Primary | ICD-10-CM

## 2024-02-05 DIAGNOSIS — K59.04 CHRONIC IDIOPATHIC CONSTIPATION: ICD-10-CM

## 2024-02-05 RX ORDER — LEVONORGESTREL AND ETHINYL ESTRADIOL 0.1-0.02MG
1 KIT ORAL DAILY
Qty: 28 TABLET | Refills: 11 | Status: SHIPPED | OUTPATIENT
Start: 2024-02-05 | End: 2025-02-04

## 2024-02-05 NOTE — PROGRESS NOTES
Adebayo Man mother verbally consents to a Virtual/Telephone Check-In service on 02/05/24.    Time spent: 30 min.    Adebayo Huerta is a 28 year old female who presents for abnormal uterine bleeding.      HPI:  The patient complaints of abnormal uterine bleeding. Pt has prolonged periods for 1 cycles. Started suddenly.Pt has been having medium flow bleeding since January 16.No prior problem.    No back pain, SOB,no pelvic, no hot flashes.  Pt has chronic constipation, takes some meds that will constipate the pt.    Wt Readings from Last 6 Encounters:   01/23/23 168 lb (76.2 kg)   12/08/22 170 lb (77.1 kg)   07/22/22 160 lb (72.6 kg)   04/08/22 159 lb (72.1 kg)   04/05/22 160 lb (72.6 kg)   03/09/22 174 lb (78.9 kg)     There is no height or weight on file to calculate BMI.     Cholesterol, Total (mg/dL)   Date Value   05/15/2023 180   01/17/2017 176     HDL Cholesterol (mg/dL)   Date Value   05/15/2023 61 (H)   01/17/2017 48     LDL Cholesterol (mg/dL)   Date Value   05/15/2023 108 (H)   01/17/2017 119 (H)     AST (U/L)   Date Value   05/15/2023 14 (L)   12/08/2022 13 (L)   12/07/2022 9 (L)   07/25/2013 17     ALT (U/L)   Date Value   05/15/2023 14   12/08/2022 12 (L)   12/07/2022 14   07/25/2013 12 (L)      Current Outpatient Medications   Medication Sig Dispense Refill    Levonorgestrel-Ethinyl Estrad (AVIANE) 0.1-20 MG-MCG Oral Tab Take 1 tablet by mouth daily. 28 tablet 11    QUEtiapine 300 MG Oral Tab Take 1 tablet (300 mg total) by mouth nightly. 90 tablet 1    guanFACINE 1 MG Oral Tab TAKE TWO TABLETS BY MOUTH IN THE MORNING AND 1 TABLET AT 5 IN THE EVENING 90 tablet 5    divalproex  MG Oral Tab EC DR tab Take 1 tablet (250 mg total) by mouth 3 (three) times daily. 270 tablet 3    LINZESS 290 MCG Oral Cap Take 1 capsule (290 mcg total) by mouth every morning.      Ferrous Sulfate (FEROSUL) 325 (65 Fe) MG Oral Tab Take 1 tablet (325 mg total) by mouth daily with breakfast. 90 tablet 3     QUEtiapine 100 MG Oral Tab Take 1 tablet (100 mg total) by mouth daily. 90 tablet 3      Past Medical History:   Diagnosis Date    ADHD (attention deficit hyperactivity disorder)     Autism     Autistic spectrum disorder     Bloating     Decorative tattoo     Migraines     Obstructive sleep apnea 10/18/2007    Weight gain       Past Surgical History:   Procedure Laterality Date    BACK SURGERY  2008    BACK SURGERY      2009    COLONOSCOPY N/A 4/14/2022    Procedure: COLONOSCOPY;  Surgeon: Brenton Gross MD;  Location: [facility]      Family History   Problem Relation Age of Onset    Diabetes Other     Diabetes Maternal Grandmother     Diabetes Maternal Grandfather       Social History:  Social History     Socioeconomic History    Marital status: Single   Tobacco Use    Smoking status: Never    Smokeless tobacco: Never   Vaping Use    Vaping Use: Never used   Substance and Sexual Activity    Alcohol use: No    Drug use: No   Other Topics Concern    Reaction to local anesthetic No   Social History Narrative    ** Merged History Encounter **         ** Merged History Encounter **              REVIEW OF SYSTEMS:   GENERAL: feels tired, no lethargy, no fever, no chills  SKIN: denies any unusual skin lesions, rash.  LUNGS: denies shortness of breath with exertion  CARDIOVASCULAR: denies chest pain on exertion  GI: as above.No heartburn.No melena, no rectal bleeding.No vomiting.  :no dysuria.  NEURO: denies headaches      EXAM:   LMP 05/06/2023 (Exact Date)   There is no height or weight on file to calculate BMI.   GENERAL: well developed, well nourished,in no apparent distress  SKIN: no rashes,no suspicious lesions  HEENT:Mild dry oral mucosa.  EYES:PERRLA, EOMI, sclera not icteric.  NECK: supple,no adenopathy  LUNGS: clear to auscultation  CARDIO: RRR without murmur  GI: good BS's,no masses, HSM or tenderness, Ortega negative, no guarding, no Psoas sign. No hernias.  EXTREMITIES: no edema  NEURO: DTR 2+bilaterally upper  and lower extremities.    ASSESSMENT AND PLAN:   Adebayo Huerta is a 28 year old female who presents for:     Diagnoses and all orders for this visit:        Chronic idiopathic constipation  Increase water intake, high fiber food, Prunelax OTC prn, handout given.      DUB (dysfunctional uterine bleeding)  -     Levonorgestrel-Ethinyl Estrad (AVIANE) 0.1-20 MG-MCG Oral Tab; Take 1 tablet by mouth daily.    F/u in 3 months.

## 2024-02-06 ENCOUNTER — TELEPHONE (OUTPATIENT)
Dept: FAMILY MEDICINE CLINIC | Facility: CLINIC | Age: 29
End: 2024-02-06

## 2024-02-07 NOTE — TELEPHONE ENCOUNTER
Form completed by MD.   Copy sent to scanning, faxed back as requested, to [phone]. Confirmation rcvd. Sent [email or web address] to inform pt.

## 2024-02-22 ENCOUNTER — HOSPITAL ENCOUNTER (EMERGENCY)
Age: 29
Discharge: HOME | End: 2024-02-22
Attending: STUDENT IN AN ORGANIZED HEALTH CARE EDUCATION/TRAINING PROGRAM
Payer: MEDICAID

## 2024-02-22 ENCOUNTER — APPOINTMENT (OUTPATIENT)
Dept: ULTRASOUND IMAGING | Age: 29
End: 2024-02-22
Attending: STUDENT IN AN ORGANIZED HEALTH CARE EDUCATION/TRAINING PROGRAM
Payer: MEDICAID

## 2024-02-22 VITALS
OXYGEN SATURATION: 100 % | TEMPERATURE: 98 F | HEART RATE: 82 BPM | DIASTOLIC BLOOD PRESSURE: 77 MMHG | HEIGHT: 67 IN | SYSTOLIC BLOOD PRESSURE: 114 MMHG | RESPIRATION RATE: 16 BRPM | BODY MASS INDEX: 25.58 KG/M2 | WEIGHT: 163 LBS

## 2024-02-22 DIAGNOSIS — N94.6 DYSMENORRHEA: Primary | ICD-10-CM

## 2024-02-22 LAB
B-HCG UR QL: NEGATIVE
BILIRUB UR QL STRIP.AUTO: NEGATIVE
CLARITY UR REFRACT.AUTO: CLEAR
COLOR UR AUTO: YELLOW
GLUCOSE UR STRIP.AUTO-MCNC: NEGATIVE MG/DL
KETONES UR STRIP.AUTO-MCNC: 15 MG/DL
LEUKOCYTE ESTERASE UR QL STRIP.AUTO: NEGATIVE
NITRITE UR QL STRIP.AUTO: NEGATIVE
PH UR STRIP.AUTO: 6 [PH] (ref 5–8)
PROT UR STRIP.AUTO-MCNC: NEGATIVE MG/DL
SP GR UR STRIP.AUTO: 1.02 (ref 1–1.03)
UROBILINOGEN UR STRIP.AUTO-MCNC: 2 MG/DL

## 2024-02-22 PROCEDURE — 81001 URINALYSIS AUTO W/SCOPE: CPT | Performed by: STUDENT IN AN ORGANIZED HEALTH CARE EDUCATION/TRAINING PROGRAM

## 2024-02-22 PROCEDURE — 93975 VASCULAR STUDY: CPT | Performed by: STUDENT IN AN ORGANIZED HEALTH CARE EDUCATION/TRAINING PROGRAM

## 2024-02-22 PROCEDURE — 81015 MICROSCOPIC EXAM OF URINE: CPT | Performed by: STUDENT IN AN ORGANIZED HEALTH CARE EDUCATION/TRAINING PROGRAM

## 2024-02-22 PROCEDURE — 99284 EMERGENCY DEPT VISIT MOD MDM: CPT

## 2024-02-22 PROCEDURE — 76856 US EXAM PELVIC COMPLETE: CPT | Performed by: STUDENT IN AN ORGANIZED HEALTH CARE EDUCATION/TRAINING PROGRAM

## 2024-02-22 PROCEDURE — 81025 URINE PREGNANCY TEST: CPT

## 2024-02-22 PROCEDURE — 96372 THER/PROPH/DIAG INJ SC/IM: CPT

## 2024-02-22 RX ORDER — KETOROLAC TROMETHAMINE 15 MG/ML
15 INJECTION, SOLUTION INTRAMUSCULAR; INTRAVENOUS ONCE
Status: COMPLETED | OUTPATIENT
Start: 2024-02-22 | End: 2024-02-22

## 2024-02-22 RX ORDER — KETOROLAC TROMETHAMINE 15 MG/ML
15 INJECTION, SOLUTION INTRAMUSCULAR; INTRAVENOUS ONCE
Status: DISCONTINUED | OUTPATIENT
Start: 2024-02-22 | End: 2024-02-22

## 2024-02-22 RX ORDER — IBUPROFEN 600 MG/1
600 TABLET ORAL EVERY 8 HOURS PRN
Qty: 30 TABLET | Refills: 0 | Status: SHIPPED | OUTPATIENT
Start: 2024-02-22 | End: 2024-02-29

## 2024-02-22 NOTE — ED PROVIDER NOTES
History     Chief Complaint   Patient presents with    Abdomen/Flank Pain       HPI    28 year old female with history of ASD, dysmenorrhea on OCP brought in by family for evaluation of left pelvic/suprapubic cramping pain since this morning.  Patient has been menstruating for the past month, she started on OCPs about 2 weeks ago by her OB/GYN.  She is using about 3 pads per day.  No fevers, vomiting, diarrhea, urinary symptoms.  She does have occasional cramps with her abnormal menstruations.          Past Medical History:   Diagnosis Date    ADHD (attention deficit hyperactivity disorder)     Autism (HCC)     Autistic spectrum disorder (HCC)     Bloating     Decorative tattoo     Migraines     Obstructive sleep apnea 10/18/2007    Weight gain        Past Surgical History:   Procedure Laterality Date    BACK SURGERY  2008    BACK SURGERY      2009    COLONOSCOPY N/A 4/14/2022    Procedure: COLONOSCOPY;  Surgeon: Brenton Gross MD;  [facility]       Social History     Socioeconomic History    Marital status: Single   Tobacco Use    Smoking status: Never    Smokeless tobacco: Never   Vaping Use    Vaping Use: Never used   Substance and Sexual Activity    Alcohol use: No    Drug use: No   Other Topics Concern    Reaction to local anesthetic No   Social History Narrative    ** Merged History Encounter **         ** Merged History Encounter **                        Physical Exam     ED Triage Vitals [02/22/24 1304]   /69   Pulse 82   Resp 16   Temp 98.4 °F (36.9 °C)   Temp src Temporal   SpO2 99 %   O2 Device None (Room air)       Physical Exam  Constitutional:       General: She is not in acute distress.  Eyes:      Extraocular Movements: Extraocular movements intact.   Cardiovascular:      Rate and Rhythm: Normal rate.      Pulses: Normal pulses.   Pulmonary:      Effort: Pulmonary effort is normal. No respiratory distress.   Abdominal:      General: Abdomen is flat.      Palpations: Abdomen is  soft.      Tenderness: There is abdominal tenderness (Mild along the left pelvic region). There is no guarding or rebound.   Musculoskeletal:      Cervical back: Normal range of motion.   Neurological:      General: No focal deficit present.      Mental Status: She is alert.              ED Course     Labs Reviewed   URINALYSIS, ROUTINE - Abnormal; Notable for the following components:       Result Value    Ketones Urine 15 (*)     Blood Urine Moderate (*)     Urobilinogen Urine 2.0 (*)     All other components within normal limits   UA MICROSCOPIC ONLY, URINE - Abnormal; Notable for the following components:    RBC Urine 6-10 (*)     Squamous Epi. Cells Few (*)     All other components within normal limits   POCT PREGNANCY URINE - Normal     US PELVIS W DOPPLER (CMI=[id number])    Result Date: 2/22/2024  CONCLUSION:  Bicornuate morphology of the uterus.   LOCATION:  [id number]    Dictated by (CST): Jc Macias MD on 2/22/2024 at 4:29 PM     Finalized by (CST): Jc Macias MD on 2/22/2024 at 4:31 PM            MDM     Vitals:    02/22/24 1304 02/22/24 1435 02/22/24 1622   BP: 102/69 108/72 114/77   Pulse: 82 80 82   Resp: 16 16 16   Temp: 98.4 °F (36.9 °C)     TempSrc: Temporal     SpO2: 99% 100% 100%   Weight: 73.9 kg     Height: 170.2 cm (5' 7\")         Pain from dysmenorrhea, ovarian cyst, less likely torsion, UTI on differential.  Abdomen without any signs of peritonitis.  Patient has no right lower quadrant tenderness to suggest surgical pathology such as appendicitis  Urinalysis without evidence of infection.    ED Course as of 02/22/24 1655  ------------------------------------------------------------  Time: 02/22 1635  Comment: Ultrasound with bicornuate uterus, no evidence of torsion.  Urinalysis without evidence of infection.       Good response to Toradol here.  Discussed findings with patient and mom at bedside.  Feeling well to be discharged home, prescription for NSAIDs, follow-up with OB/GYN.  Return  precautions.    Disposition and Plan     Clinical Impression:  1. Dysmenorrhea        Disposition:  Discharge    Follow-up:  your OBGYN    Follow up in 1 week(s)        Medications Prescribed:  Current Discharge Medication List        START taking these medications    Details   ibuprofen 600 MG Oral Tab Take 1 tablet (600 mg total) by mouth every 8 (eight) hours as needed for Pain or Fever.  Qty: 30 tablet, Refills: 0

## 2024-02-22 NOTE — ED INITIAL ASSESSMENT (HPI)
C/o sharp lower abd pain that began this am-- no vomiting-- denies urinary complaints--  Just started BCP approx 10 days ago for continuous period

## 2024-03-13 RX ORDER — TRIAMCINOLONE ACETONIDE 1 MG/G
OINTMENT TOPICAL
Qty: 454 G | Refills: 9 | OUTPATIENT
Start: 2024-03-13

## 2024-03-28 NOTE — TELEPHONE ENCOUNTER
Medication(s) to Refill:   Requested Prescriptions     Pending Prescriptions Disp Refills    QUETIAPINE 300 MG Oral Tab [Pharmacy Med Name: QUETIAPINE FUMARATE 300MG TAB] 90 tablet 4     Sig: TAKE 1 TABLET (300 MG TOTAL) BY MOUTH NIGHTLY.         Reason for Medication Refill being sent to Provider / Reason Protocol Failed:  [] 90 day refill has already been granted  [] Blood Pressure out of range  [] Labs Abnormal/over due  [] Medication not previously prescribed by Provider  [x] Non-Protocol Medication  [] Controlled Substance   [] Due for appointment- no future appointment scheduled  [] No Follow up specified      Last Time Medication was Filled:  3/1/2024      Last Office Visit with PCP: 2/5/2024     When Patient was Due Back to the Office:  3 months   (from when PCP last addressed condition)    Future Appointments:  No future appointments.      Last Blood Pressures:  BP Readings from Last 2 Encounters:   02/22/24 114/77   05/15/23 96/58     Action taken:  [] Refill approved per protocol  [x] Routing to provider for approval

## 2024-03-29 RX ORDER — QUETIAPINE FUMARATE 300 MG/1
300 TABLET, FILM COATED ORAL NIGHTLY
Qty: 90 TABLET | Refills: 4 | Status: SHIPPED | OUTPATIENT
Start: 2024-03-29

## 2024-04-01 RX ORDER — GUANFACINE 1 MG/1
TABLET ORAL
Qty: 90 TABLET | Refills: 4 | OUTPATIENT
Start: 2024-04-01

## 2024-04-01 RX ORDER — GUANFACINE 1 MG/1
TABLET ORAL
Qty: 90 TABLET | Refills: 4 | Status: SHIPPED | OUTPATIENT
Start: 2024-04-01

## 2024-04-27 NOTE — TELEPHONE ENCOUNTER
CSS- Pt has not seend Dr. Darling for almost 2 years, please schedule appt. And send back to triage    Review pended refill request as it does not fall under a protocol.    Last Rx: 3/29/24  LOV: 6/13/22

## 2024-04-29 RX ORDER — QUETIAPINE FUMARATE 100 MG/1
TABLET, FILM COATED ORAL
Qty: 90 TABLET | Refills: 10 | OUTPATIENT
Start: 2024-04-29

## 2024-04-29 RX ORDER — FERROUS SULFATE 325(65) MG
TABLET ORAL
Qty: 90 TABLET | Refills: 10 | OUTPATIENT
Start: 2024-04-29

## 2024-04-29 NOTE — TELEPHONE ENCOUNTER
See message below, patient has new PCP    Requested Prescriptions   Pending Prescriptions Disp Refills    QUETIAPINE 100 MG Oral Tab [Pharmacy Med Name: QUETIAPINE FUMARATE 100MG TAB] 90 tablet 10     Sig: TAKE ONE TABLET BY MOUTH DAILY : MOOD 04/12/23       There is no refill protocol information for this order       FEROSUL 325 (65 Fe) MG Oral Tab [Pharmacy Med Name: FERROUS SULFATE 325MG TAB] 90 tablet 10     Sig: TAKE 1 TABLET (325 MG TOTAL) BY MOUTH DAILY WITH BREAKFAST. : SUPPLEMENT SE: CONSTIPATION 04/12/23       There is no refill protocol information for this order           Recent Outpatient Visits              2 months ago DUB (dysfunctional uterine bleeding)    [place] [address], Lizeth Bedoya MD    Virtual Phone E/M    11 months ago Routine general medical examination at a health care facility    [place] [address], Lizeth Bedoya MD    Office Visit    1 year ago Constipation, unspecified constipation type    [facility],  [facility] Muriel Bliss DO    Office Visit    1 year ago COVID-19    [facility], Sarah Clinton MD    Telemedicine    2 years ago Constipation, unspecified constipation type    [facility] Zarina Nassar APRN    Office Visit

## 2024-05-28 RX ORDER — QUETIAPINE FUMARATE 100 MG/1
TABLET, FILM COATED ORAL
Qty: 90 TABLET | Refills: 10 | OUTPATIENT
Start: 2024-05-28

## 2024-07-01 RX ORDER — TRIAMCINOLONE ACETONIDE 1 MG/G
OINTMENT TOPICAL
Qty: 454 G | Refills: 0 | OUTPATIENT
Start: 2024-07-01

## 2024-07-02 RX ORDER — TRIAMCINOLONE ACETONIDE 1 MG/G
1 OINTMENT TOPICAL 2 TIMES DAILY
Qty: 454 G | Refills: 0 | Status: SHIPPED | OUTPATIENT
Start: 2024-07-02

## 2024-07-30 RX ORDER — TRIAMCINOLONE ACETONIDE 1 MG/G
OINTMENT TOPICAL
Qty: 454 G | Refills: 0 | OUTPATIENT
Start: 2024-07-30

## 2024-08-25 RX ORDER — GUANFACINE 1 MG/1
TABLET ORAL
Qty: 90 TABLET | Refills: 3 | OUTPATIENT
Start: 2024-08-25

## 2024-09-24 RX ORDER — QUETIAPINE FUMARATE 300 MG/1
300 TABLET, FILM COATED ORAL NIGHTLY
Qty: 30 TABLET | Refills: 0 | Status: SHIPPED | OUTPATIENT
Start: 2024-09-24

## 2024-09-26 ENCOUNTER — TELEPHONE (OUTPATIENT)
Dept: FAMILY MEDICINE CLINIC | Facility: CLINIC | Age: 29
End: 2024-09-26

## 2024-09-26 RX ORDER — DIVALPROEX SODIUM 250 MG/1
TABLET, DELAYED RELEASE ORAL
Qty: 270 TABLET | Refills: 8 | OUTPATIENT
Start: 2024-09-26

## 2024-09-26 RX ORDER — GUANFACINE 1 MG/1
TABLET ORAL
Qty: 90 TABLET | Refills: 1 | Status: SHIPPED | OUTPATIENT
Start: 2024-09-26

## 2024-09-26 RX ORDER — DIVALPROEX SODIUM 250 MG/1
250 TABLET, DELAYED RELEASE ORAL 3 TIMES DAILY
Qty: 270 TABLET | Refills: 0 | Status: SHIPPED | OUTPATIENT
Start: 2024-09-26

## 2024-09-26 NOTE — TELEPHONE ENCOUNTER
Adebayo Huerta requesting Medication Refill for:    Medication name and dose (copy and paste from medication list): divalproex  MG Oral Tab EC DR tab     If medication is not on medication list - transfer patient to RN queue for triage    Preferred Pharmacy: [facility] - [place]     LOV: Visit date not found   Last Refill date: 8/6/23  Next Scheduled appointment: 11/4/2024

## 2024-09-26 NOTE — TELEPHONE ENCOUNTER
Rx sent to pharmacy  Called mother and left vm to call office back  Referral will need to be placed-order pended  Office number provided

## 2024-09-26 NOTE — TELEPHONE ENCOUNTER
guanFACINE 1 MG Oral Tab     Pharmacy called to stated that the direction on their medication has the wrong instructions. They are supposed to take just 2 in the morning per patient. Needs the dosage fixed.

## 2024-09-26 NOTE — TELEPHONE ENCOUNTER
Called pharmacy, talked with Linda who states guanfacine should be just 2 tablets in the morning. Rx sent on 04/01 states for pt to take 2 tablets in the morning and 1 in the evening. Please approve or deny pending Rx with updated directions.     Linda then asking about refill for divalproex. Informed Linda that Rx is being prescribed by another provider. Linda verbalized understanding

## 2024-09-27 RX ORDER — TRIAMCINOLONE ACETONIDE 1 MG/G
OINTMENT TOPICAL
Qty: 454 G | Refills: 0 | OUTPATIENT
Start: 2024-09-27

## 2024-11-04 ENCOUNTER — TELEPHONE (OUTPATIENT)
Dept: FAMILY MEDICINE CLINIC | Facility: CLINIC | Age: 29
End: 2024-11-04

## 2024-11-04 ENCOUNTER — LAB ENCOUNTER (OUTPATIENT)
Dept: LAB | Age: 29
End: 2024-11-04
Attending: FAMILY MEDICINE
Payer: MEDICAID

## 2024-11-04 ENCOUNTER — OFFICE VISIT (OUTPATIENT)
Dept: FAMILY MEDICINE CLINIC | Facility: CLINIC | Age: 29
End: 2024-11-04
Payer: MEDICAID

## 2024-11-04 VITALS
SYSTOLIC BLOOD PRESSURE: 102 MMHG | HEIGHT: 67 IN | BODY MASS INDEX: 27.78 KG/M2 | DIASTOLIC BLOOD PRESSURE: 62 MMHG | OXYGEN SATURATION: 96 % | HEART RATE: 85 BPM | WEIGHT: 177 LBS

## 2024-11-04 DIAGNOSIS — Z00.00 LABORATORY EXAMINATION ORDERED AS PART OF A ROUTINE GENERAL MEDICAL EXAMINATION: ICD-10-CM

## 2024-11-04 DIAGNOSIS — Z00.00 ROUTINE GENERAL MEDICAL EXAMINATION AT A HEALTH CARE FACILITY: Primary | ICD-10-CM

## 2024-11-04 DIAGNOSIS — H61.23 BILATERAL IMPACTED CERUMEN: ICD-10-CM

## 2024-11-04 DIAGNOSIS — M25.562 PAIN AND SWELLING OF LEFT KNEE: ICD-10-CM

## 2024-11-04 DIAGNOSIS — M25.462 PAIN AND SWELLING OF LEFT KNEE: ICD-10-CM

## 2024-11-04 LAB
ALBUMIN SERPL-MCNC: 4.1 G/DL (ref 3.2–4.8)
ALBUMIN/GLOB SERPL: 1.5 {RATIO} (ref 1–2)
ALP LIVER SERPL-CCNC: 48 U/L
ALT SERPL-CCNC: <7 U/L
ANION GAP SERPL CALC-SCNC: 5 MMOL/L (ref 0–18)
AST SERPL-CCNC: 15 U/L (ref ?–34)
BASOPHILS # BLD AUTO: 0.05 X10(3) UL (ref 0–0.2)
BASOPHILS NFR BLD AUTO: 1.1 %
BILIRUB SERPL-MCNC: 0.2 MG/DL (ref 0.3–1.2)
BUN BLD-MCNC: 9 MG/DL (ref 9–23)
CALCIUM BLD-MCNC: 9.2 MG/DL (ref 8.7–10.4)
CHLORIDE SERPL-SCNC: 109 MMOL/L (ref 98–112)
CHOLEST SERPL-MCNC: 171 MG/DL (ref ?–200)
CO2 SERPL-SCNC: 26 MMOL/L (ref 21–32)
CREAT BLD-MCNC: 0.77 MG/DL
EGFRCR SERPLBLD CKD-EPI 2021: 106 ML/MIN/1.73M2 (ref 60–?)
EOSINOPHIL # BLD AUTO: 0.14 X10(3) UL (ref 0–0.7)
EOSINOPHIL NFR BLD AUTO: 2.9 %
ERYTHROCYTE [DISTWIDTH] IN BLOOD BY AUTOMATED COUNT: 14.5 %
FASTING PATIENT LIPID ANSWER: YES
FASTING STATUS PATIENT QL REPORTED: YES
GLOBULIN PLAS-MCNC: 2.7 G/DL (ref 2–3.5)
GLUCOSE BLD-MCNC: 78 MG/DL (ref 70–99)
HCT VFR BLD AUTO: 38.9 %
HDLC SERPL-MCNC: 54 MG/DL (ref 40–59)
HGB BLD-MCNC: 11.7 G/DL
IMM GRANULOCYTES # BLD AUTO: 0 X10(3) UL (ref 0–1)
IMM GRANULOCYTES NFR BLD: 0 %
LDLC SERPL CALC-MCNC: 106 MG/DL (ref ?–100)
LYMPHOCYTES # BLD AUTO: 1.65 X10(3) UL (ref 1–4)
LYMPHOCYTES NFR BLD AUTO: 34.7 %
MCH RBC QN AUTO: 26.6 PG (ref 26–34)
MCHC RBC AUTO-ENTMCNC: 30.1 G/DL (ref 31–37)
MCV RBC AUTO: 88.4 FL
MONOCYTES # BLD AUTO: 0.47 X10(3) UL (ref 0.1–1)
MONOCYTES NFR BLD AUTO: 9.9 %
NEUTROPHILS # BLD AUTO: 2.45 X10 (3) UL (ref 1.5–7.7)
NEUTROPHILS # BLD AUTO: 2.45 X10(3) UL (ref 1.5–7.7)
NEUTROPHILS NFR BLD AUTO: 51.4 %
NONHDLC SERPL-MCNC: 117 MG/DL (ref ?–130)
OSMOLALITY SERPL CALC.SUM OF ELEC: 288 MOSM/KG (ref 275–295)
PLATELET # BLD AUTO: 253 10(3)UL (ref 150–450)
POTASSIUM SERPL-SCNC: 4.2 MMOL/L (ref 3.5–5.1)
PROT SERPL-MCNC: 6.8 G/DL (ref 5.7–8.2)
RBC # BLD AUTO: 4.4 X10(6)UL
SODIUM SERPL-SCNC: 140 MMOL/L (ref 136–145)
TRIGL SERPL-MCNC: 55 MG/DL (ref 30–149)
TSI SER-ACNC: 1.58 UIU/ML (ref 0.55–4.78)
VIT D+METAB SERPL-MCNC: 29.2 NG/ML (ref 30–100)
VLDLC SERPL CALC-MCNC: 9 MG/DL (ref 0–30)
WBC # BLD AUTO: 4.8 X10(3) UL (ref 4–11)

## 2024-11-04 PROCEDURE — 85025 COMPLETE CBC W/AUTO DIFF WBC: CPT | Performed by: FAMILY MEDICINE

## 2024-11-04 PROCEDURE — 36415 COLL VENOUS BLD VENIPUNCTURE: CPT | Performed by: FAMILY MEDICINE

## 2024-11-04 PROCEDURE — 80061 LIPID PANEL: CPT | Performed by: FAMILY MEDICINE

## 2024-11-04 PROCEDURE — 82306 VITAMIN D 25 HYDROXY: CPT

## 2024-11-04 PROCEDURE — 80053 COMPREHEN METABOLIC PANEL: CPT | Performed by: FAMILY MEDICINE

## 2024-11-04 PROCEDURE — 84443 ASSAY THYROID STIM HORMONE: CPT | Performed by: FAMILY MEDICINE

## 2024-11-04 PROCEDURE — 99395 PREV VISIT EST AGE 18-39: CPT | Performed by: FAMILY MEDICINE

## 2024-11-04 RX ORDER — TRIAMCINOLONE ACETONIDE 1 MG/G
1 OINTMENT TOPICAL 2 TIMES DAILY
Qty: 30 G | Refills: 2 | Status: SHIPPED | OUTPATIENT
Start: 2024-11-04

## 2024-11-04 RX ORDER — DIVALPROEX SODIUM 125 MG/1
125 CAPSULE, COATED PELLETS ORAL
Qty: 7 CAPSULE | Refills: 0 | Status: SHIPPED | OUTPATIENT
Start: 2024-11-04

## 2024-11-04 NOTE — TELEPHONE ENCOUNTER
Patients mother called (on HIPAA) she was just wanting to clarify the instructions on patients medication.   All questions answered.

## 2024-11-04 NOTE — PROGRESS NOTES
Adebayo Huerta is a 29 year old female who presents for a complete physical exam, no gyn.  HPI:     Chief Complaint   Patient presents with    Physical       Patient feels well, dental visit up to date, no hearing problem.  Vaccinations up to date.  Left knee swelling and pain on and off for months.  Wt Readings from Last 3 Encounters:   11/04/24 177 lb (80.3 kg)   02/22/24 163 lb (73.9 kg)   01/23/23 168 lb (76.2 kg)      BP Readings from Last 3 Encounters:   11/04/24 102/62   02/22/24 114/77   05/15/23 96/58     Patient's last menstrual period was 10/22/2024 (approximate).         Current Outpatient Medications   Medication Sig Dispense Refill    triamcinolone 0.1 % External Ointment Apply 1 Application  topically 2 (two) times daily. 30 g 2    divalproex  MG Oral Capsule Delayed Release Sprinkle Take 1 capsule (125 mg total) by mouth daily with breakfast. Take it for 7 days and stop it. 7 capsule 0    guanFACINE 1 MG Oral Tab TAKE TWO TABLETS BY MOUTH IN THE MORNING 90 tablet 1    divalproex  MG Oral Tab EC DR tab Take 1 tablet (250 mg total) by mouth 3 (three) times daily. 270 tablet 0    QUEtiapine 300 MG Oral Tab TAKE ONE TABLET BY MOUTH DAILY AT BEDTIME 30 tablet 0    QUEtiapine 100 MG Oral Tab Take 1 tablet (100 mg total) by mouth daily. 90 tablet 3    Levonorgestrel-Ethinyl Estrad (AVIANE) 0.1-20 MG-MCG Oral Tab Take 1 tablet by mouth daily. (Patient not taking: Reported on 11/4/2024) 28 tablet 11    LINZESS 290 MCG Oral Cap Take 1 capsule (290 mcg total) by mouth every morning. (Patient not taking: Reported on 11/4/2024)      Ferrous Sulfate (FEROSUL) 325 (65 Fe) MG Oral Tab Take 1 tablet (325 mg total) by mouth daily with breakfast. (Patient not taking: Reported on 11/4/2024) 90 tablet 3      Past Medical History:    ADHD (attention deficit hyperactivity disorder)    Autism (HCC)    Autistic spectrum disorder (HCC)    Bloating    Decorative tattoo    Migraines    Obstructive sleep apnea     Weight gain      Past Surgical History:   Procedure Laterality Date    Back surgery  2008    Back surgery      2009    Colonoscopy N/A 4/14/2022    Procedure: COLONOSCOPY;  Surgeon: Brenton Gross MD;  [facility]      Family History   Problem Relation Age of Onset    Diabetes Other     Diabetes Maternal Grandmother     Diabetes Maternal Grandfather       Social History     Socioeconomic History    Marital status: Single   Tobacco Use    Smoking status: Never    Smokeless tobacco: Never   Vaping Use    Vaping status: Never Used   Substance and Sexual Activity    Alcohol use: No    Drug use: No   Other Topics Concern    Reaction to local anesthetic No   Social History Narrative    ** Merged History Encounter **         ** Merged History Encounter **             REVIEW OF SYSTEMS:   GENERAL HEALTH: feels well, no fatigue.  SKIN: denies any unusual skin lesions or rashes  EYES: no visual complaints or deficits  HEENT: denies nasal congestion, sinus pain or sore throat; hearing loss negative   RESPIRATORY: denies shortness of breath, wheezing or cough   CARDIOVASCULAR: denies chest pain, SOB, edema,orthopnea, no palpitations   GI: denies nausea, vomiting, constipation, diarrhea; no rectal bleeding; no heartburn  GENITAL/: no dysuria, urgency or frequency  MUSCULOSKELETAL: no other joint complaints upper or lower extremities  NEURO: no sensory or motor complaint  HEMATOLOGY: denies hx anemia; denies bruising or excessive bleeding  ENDOCRINE: denies excessive thirst or urination; denies unexpected wt gain or wt loss  ALLERGY/IMM.: denies food or seasonal allergies  PSYCH: no symptoms of depression or anxiety      EXAM:   /62   Pulse 85   Ht 5' 7\" (1.702 m)   Wt 177 lb (80.3 kg)   LMP 10/22/2024 (Approximate)   SpO2 96%   BMI 27.72 kg/m²      General: WD/WN in no acute distress.   HEENT: PERRLA and EOMI.  OP moist no lesions.  Neck is supple, with no cervical LAD or thyroid abnormalities. No carotid  bruits.  Bilateral cerumen impaction.  Lungs: are clear to auscultation bilaterally, with no wheeze, rhonchi, or rales.   Heart: is RRR.  S1, S2, with no murmurs,clicks, gallops  Abdomen: is soft,NBS, NT/ND with no HSM.  No rebound or guarding. No CVA tenderness, no hernias.  Neuro: Cranial nerves II-XII normal,no focal abnormalities, and reflexes coordination and gait normal and symmetric.Sensation intact.  Extremities: are symmetric with no cyanosis, clubbing, or edema.  MS: Normal muscles tones, no joints abnormalities.  SKIN: Normal color, turgor, no lesions, rashes or wounds.  PSYCH: Normal affect and mood.          ASSESSMENT AND PLAN:     Adebayo Huerta is a 29 year old female who presents for a complete physical exam.   Pt's was recommended low fat diet and aerobic exercise 30 minutes three times weekly.   Autism, insomnia, bipolar: pt is stable,   Requested Prescriptions      divalproex  MG Oral Capsule Delayed Release Sprinkle 7 capsule 0     Sig: Take 1 capsule (125 mg total) by mouth daily with breakfast. Take it for 7 days and stop it.    Adebayo was seen today for physical.    Diagnoses and all orders for this visit:    Laboratory examination ordered as part of a routine general medical examination  -     CBC With Differential With Platelet  -     Comp Metabolic Panel (14)  -     Lipid Panel  -     Vitamin D; Future  -     TSH W Reflex To Free T4    Pain and swelling of left knee  -     ORTHOPEDIC - INTERNAL    Bilateral impacted cerumen: referral to ENT>         Health maintenance.   The patient indicates understanding of these issues and agrees to the plan.  The patient is asked to return for CPX in 1 year.

## 2024-11-04 NOTE — PATIENT INSTRUCTIONS
Dr Jeferson Anthony or partners for knee pain.    [facility]  [address] [place] [address], [facility],  below ER    Orthopedic Surgery  [address], [place] [address]  Tel:[phone]  FAX: [phone]                          ENT group:      Dr. TALIA Cordero        [facility]  [address] [place] [address],   [place] [address]          [address]  (Opened 6/15/22)   [place]  [address]          [phone].

## 2024-12-11 RX ORDER — GUANFACINE 1 MG/1
TABLET ORAL
Qty: 90 TABLET | Refills: 0 | Status: SHIPPED | OUTPATIENT
Start: 2024-12-11

## 2024-12-11 NOTE — TELEPHONE ENCOUNTER
Medication(s) to Refill:   Requested Prescriptions     Pending Prescriptions Disp Refills    guanFACINE 1 MG Oral Tab [Pharmacy Med Name: guanFACINE HCL 1MG TAB] 90 tablet 0     Sig: TAKE TWO TABLETS BY MOUTH IN THE MORNING         Reason for Medication Refill being sent to Provider / Reason Protocol Failed:  [] 90 day refill has already been granted  [] Blood Pressure out of range  [] Labs Abnormal/over due  [] Medication not previously prescribed by Provider  [] Non-Protocol Medication  [] Controlled Substance   [] Due for appointment- no future appointment scheduled  [] No Follow up specified      Last Time Medication was Filled:  9/26/24      Last Office Visit with PCP: 11/4/24    When Patient was Due Back to the Office:  1 year  (from when PCP last addressed condition)    Future Appointments:  No future appointments.      Last Blood Pressures:  BP Readings from Last 2 Encounters:   11/04/24 102/62   02/22/24 114/77         Action taken:  [] Refill approved per protocol  [] Routing to provider for approval

## 2024-12-12 RX ORDER — TRIAMCINOLONE ACETONIDE 1 MG/G
1 OINTMENT TOPICAL 2 TIMES DAILY
Qty: 454 G | Refills: 0 | Status: SHIPPED | OUTPATIENT
Start: 2024-12-12

## 2024-12-12 NOTE — TELEPHONE ENCOUNTER
Linda from [facility] called stating that patients mom is requesting a 1lb jar of the Triamcinolone 0.1% external ointment instead of the 30g tubes.    Dr. Barron Huerta to prescribe the 1lb jar of Triamcinolone 0.1% cream/ointment?

## 2024-12-12 NOTE — TELEPHONE ENCOUNTER
triamcinolone 0.1 % External Ointment 454 g (1 lb jar)   Sig: Apply 1 Application  topically 2 (two) times daily  Rx pended for provider approval.

## 2025-01-22 RX ORDER — GUANFACINE 1 MG/1
TABLET ORAL
Qty: 60 TABLET | Refills: 0 | Status: SHIPPED | OUTPATIENT
Start: 2025-01-22

## 2025-02-24 ENCOUNTER — OFFICE VISIT (OUTPATIENT)
Dept: FAMILY MEDICINE CLINIC | Facility: CLINIC | Age: 30
End: 2025-02-24
Payer: MEDICAID

## 2025-02-24 VITALS
DIASTOLIC BLOOD PRESSURE: 60 MMHG | HEIGHT: 67 IN | BODY MASS INDEX: 27.31 KG/M2 | HEART RATE: 84 BPM | SYSTOLIC BLOOD PRESSURE: 100 MMHG | WEIGHT: 174 LBS | OXYGEN SATURATION: 98 % | RESPIRATION RATE: 16 BRPM

## 2025-02-24 DIAGNOSIS — L30.9 ECZEMA, UNSPECIFIED TYPE: Primary | ICD-10-CM

## 2025-02-24 DIAGNOSIS — H61.23 BILATERAL IMPACTED CERUMEN: ICD-10-CM

## 2025-02-24 RX ORDER — MOMETASONE FUROATE 1 MG/G
1 CREAM TOPICAL 2 TIMES DAILY PRN
Qty: 60 G | Refills: 3 | Status: SHIPPED | OUTPATIENT
Start: 2025-02-24 | End: 2025-03-26

## 2025-02-24 NOTE — PROGRESS NOTES
CC: Rash.         HPI:  This is a rash problem. The current episode started  long time ago  The problem has been  same   since onset. The affected locations include  hands and legs / upper extremities  . The rash is characterized by  eczema . Exposure:  none  .Pertinent negatives include no  congestion, cough, diarrhea, eye pain, facial edema, fatigue, fever, joint pain, nail changes, rhinorrhea, shortness of breath, sore throat or vomiting. Past treatments include OTC lotions, not helpful.         Current Outpatient Medications   Medication Sig Dispense Refill    Mometasone Furoate 0.1 % External Cream Apply 1 g topically 2 (two) times daily as needed. 60 g 3    GUANFACINE 1 MG Oral Tab TAKE TWO TABLETS BY MOUTH DAILY IN THE MORNING 60 tablet 0    TRIAMCINOLONE 0.1 % External Ointment APPLY 1 APPLICATION TOPICALLY TWO (TWO) TIMES DAILY. RASH SE: BURNING 12/12/2024 454 g 0    divalproex  MG Oral Capsule Delayed Release Sprinkle Take 1 capsule (125 mg total) by mouth daily with breakfast. Take it for 7 days and stop it. 7 capsule 0    divalproex  MG Oral Tab EC DR tab Take 1 tablet (250 mg total) by mouth 3 (three) times daily. 270 tablet 0    QUEtiapine 300 MG Oral Tab TAKE ONE TABLET BY MOUTH DAILY AT BEDTIME 30 tablet 0    QUEtiapine 100 MG Oral Tab Take 1 tablet (100 mg total) by mouth daily. 90 tablet 3      Past Medical History:    ADHD (attention deficit hyperactivity disorder)    Autism (HCC)    Autistic spectrum disorder (HCC)    Bloating    Decorative tattoo    Migraines    Obstructive sleep apnea    Weight gain      Past Surgical History:   Procedure Laterality Date    Back surgery  2008    Back surgery      2009    Colonoscopy N/A 4/14/2022    Procedure: COLONOSCOPY;  Surgeon: Brenton Gross MD;  Location: [facility]      Family History   Problem Relation Age of Onset    Diabetes Other     Diabetes Maternal Grandmother     Diabetes Maternal Grandfather       Social History     Socioeconomic  History    Marital status: Single   Tobacco Use    Smoking status: Never    Smokeless tobacco: Never   Vaping Use    Vaping status: Never Used   Substance and Sexual Activity    Alcohol use: No    Drug use: No   Other Topics Concern    Reaction to local anesthetic No   Social History Narrative    ** Merged History Encounter **         ** Merged History Encounter **              REVIEW OF SYSTEMS:   GENERAL: feels well otherwise, no fever, no chills.  SKIN: as above.No edema. No ulcerations.  EYES:denies blurred vision or double vision  HEENT: no rhinorrhea. No edema of the lips or swelling of throat.  CHEST: no chest pains, no palpitations.  LUNGS: denies shortness of breath with exertion or rest.No wheezing, no cough.  LYMPH: no enlargement of the lymph nodes.  MUSC/SKEL: no joint swelling,no no joint stiffness.  CARDIOVASCULAR: denies chest pain on exertion or rest.  GI: no nausea, no vomiting or abdominal pain  NEURO: no abnormal sensation, no tingling of the skin or numbness.      EXAM:   /60   Pulse 84   Resp 16   Ht 5' 7\" (1.702 m)   Wt 174 lb (78.9 kg)   LMP 10/22/2024 (Approximate)   SpO2 98%   BMI 27.25 kg/m²   GENERAL: well developed, well nourished,in no apparent distress  SKIN: dry patches bilateral hands and forearms   EYES:PERRL, EOMI, normal optic disk,conjunctiva are clear  HEENT: head atraumatic, normocephalic,TM wnl snehal,no erythema of the throat.Moist oral and throat mucosa.  NOSE- normal external nose. Mucosa normal.  NECK: supple,no adenopathy  LUNGS: clear to auscultation  CARDIO: RRR without murmur  GI: good BS's,no masses, HSM or tenderness      ASSESSMENT AND PLAN:   Adebayo Huerta is a 29 year old female who presents with:  Diagnoses and all orders for this visit:    Eczema, unspecified type  -     Mometasone Furoate 0.1 % External Cream; Apply 1 g topically 2 (two) times daily as needed.  -     DERM - EXTERNAL    Bilateral impacted cerumen     Lavage done -tolerated well    F/u for worsening symptoms   Skin care d/w the pt.   The patient indicates understanding of these issues and agrees to the plan.  The patient is asked to return in 3 days if sx's persist or worsen.

## 2025-03-20 DIAGNOSIS — R21 RASH AND NONSPECIFIC SKIN ERUPTION: ICD-10-CM

## 2025-03-20 DIAGNOSIS — F90.0 ATTENTION DEFICIT HYPERACTIVITY DISORDER (ADHD), PREDOMINANTLY INATTENTIVE TYPE: ICD-10-CM

## 2025-03-20 RX ORDER — TRIAMCINOLONE ACETONIDE 1 MG/G
OINTMENT TOPICAL
Qty: 454 G | Refills: 0 | Status: SHIPPED | OUTPATIENT
Start: 2025-03-20 | End: 2025-04-24

## 2025-03-20 RX ORDER — GUANFACINE 1 MG/1
TABLET ORAL
Qty: 60 TABLET | Refills: 0 | Status: SHIPPED | OUTPATIENT
Start: 2025-03-20 | End: 2025-04-18

## 2025-04-16 DIAGNOSIS — F90.0 ATTENTION DEFICIT HYPERACTIVITY DISORDER (ADHD), PREDOMINANTLY INATTENTIVE TYPE: ICD-10-CM

## 2025-04-18 RX ORDER — GUANFACINE 1 MG/1
2 TABLET ORAL
Qty: 60 TABLET | Refills: 0 | Status: SHIPPED | OUTPATIENT
Start: 2025-04-18 | End: 2025-05-15

## 2025-04-18 RX ORDER — GUANFACINE 1 MG/1
2 TABLET ORAL
Qty: 90 TABLET | Refills: 0 | OUTPATIENT
Start: 2025-04-18

## 2025-04-21 DIAGNOSIS — R21 RASH AND NONSPECIFIC SKIN ERUPTION: ICD-10-CM

## 2025-04-23 NOTE — TELEPHONE ENCOUNTER
Please review;  [facility] [place] protocol failed/ No protocol     Last office visit = 2/24/2025   Last refill = 1/22, 2/20, 3/20/2025      Requested Prescriptions   Pending Prescriptions Disp Refills    TRIAMCINOLONE 0.1 % External Ointment [Pharmacy Med Name: TRIAMCINOLONE ACETONIDE 0.1% OINT] 454 g 0     Sig: APPLY 1 APPLICATION TOPICALLY TWO (TWO) TIMES DAILY. RASH SE: BURNING 03/20/25       There is no refill protocol information for this order          Recent Outpatient Visits              1 month ago Eczema, unspecified type    [place] [id number], Williams Munoz APRN    Office Visit    5 months ago Routine general medical examination at a health care facility    [place] [id number] Lilia, MD    Office Visit    1 year ago DUB (dysfunctional uterine bleeding)    [place] [id number] Lilia, MD    Virtual Phone E/M    1 year ago Routine general medical examination at a health care facility    [place] [id number] Lilia, MD    Office Visit    2 years ago Constipation, unspecified constipation type    [facility],  [facility] Muriel Bliss DO    Office Visit

## 2025-04-24 RX ORDER — TRIAMCINOLONE ACETONIDE 1 MG/G
OINTMENT TOPICAL
Qty: 454 G | Refills: 0 | Status: SHIPPED | OUTPATIENT
Start: 2025-04-24 | End: 2025-05-15

## 2025-05-14 DIAGNOSIS — F90.0 ATTENTION DEFICIT HYPERACTIVITY DISORDER (ADHD), PREDOMINANTLY INATTENTIVE TYPE: ICD-10-CM

## 2025-05-14 DIAGNOSIS — R21 RASH AND NONSPECIFIC SKIN ERUPTION: ICD-10-CM

## 2025-05-15 RX ORDER — GUANFACINE 1 MG/1
2 TABLET ORAL
Qty: 60 TABLET | Refills: 0 | Status: SHIPPED | OUTPATIENT
Start: 2025-05-15

## 2025-05-15 RX ORDER — TRIAMCINOLONE ACETONIDE 1 MG/G
OINTMENT TOPICAL
Qty: 454 G | Refills: 0 | Status: SHIPPED | OUTPATIENT
Start: 2025-05-15

## 2025-05-15 NOTE — TELEPHONE ENCOUNTER
Please review.  Protocol failed/has no protocol.;    Last Office Visit:11/04/2024  Please advise if refill is appropriate.  Requested Prescriptions     Pending Prescriptions Disp Refills    triamcinolone 0.1 % External Ointment [Pharmacy Med Name: TRIAMCINOLONE ACETONIDE 0.1% OINT] 454 g 0     Sig: APPLY 1 APPLICATION TOPICALLY TWO (TWO) TIMES DAILY. RASH SE: BURNING 04/24/2025     Signed Prescriptions Disp Refills    GUANFACINE 1 MG Oral Tab 60 tablet 0     Sig: TAKE TWO TABLETS BY MOUTH DAILY IN THE MORNING     Authorizing Provider: COLIN HODGES     Ordering User: KALEB MONTENEGRO

## 2025-05-19 DIAGNOSIS — F90.0 ATTENTION DEFICIT HYPERACTIVITY DISORDER (ADHD), PREDOMINANTLY INATTENTIVE TYPE: ICD-10-CM

## 2025-05-19 DIAGNOSIS — R21 RASH AND NONSPECIFIC SKIN ERUPTION: ICD-10-CM

## 2025-05-20 RX ORDER — GUANFACINE 1 MG/1
2 TABLET ORAL
Qty: 90 TABLET | Refills: 0 | OUTPATIENT
Start: 2025-05-20

## 2025-05-20 RX ORDER — TRIAMCINOLONE ACETONIDE 1 MG/G
OINTMENT TOPICAL
Qty: 454 G | Refills: 0 | OUTPATIENT
Start: 2025-05-20

## 2025-05-20 NOTE — TELEPHONE ENCOUNTER
Guanfacine 1m month supply sent to [facility] on 05/15/2025    Triamcinolone ointment: 454 grams sent to [facility] on 05/15/2025

## 2025-05-21 DIAGNOSIS — F90.0 ATTENTION DEFICIT HYPERACTIVITY DISORDER (ADHD), PREDOMINANTLY INATTENTIVE TYPE: ICD-10-CM

## 2025-05-21 RX ORDER — QUETIAPINE FUMARATE 300 MG/1
300 TABLET, FILM COATED ORAL NIGHTLY
Qty: 90 TABLET | Refills: 1 | Status: SHIPPED | OUTPATIENT
Start: 2025-05-21

## 2025-05-22 RX ORDER — GUANFACINE 1 MG/1
2 TABLET ORAL
Qty: 90 TABLET | Refills: 0 | OUTPATIENT
Start: 2025-05-22

## 2025-05-22 NOTE — TELEPHONE ENCOUNTER
GUANFACINE 1 MG Oral Tab 60 tablet 0 5/15/2025 --    Sig - Route: TAKE TWO TABLETS BY MOUTH DAILY IN THE MORNING - Oral    Sent to pharmacy as: guanFACINE HCl 1 MG Oral Tablet (Tenex)    E-Prescribing Status: Receipt confirmed by pharmacy (5/15/2025 11:52 AM CDT)      Associated Diagnoses    Attention deficit hyperactivity disorder (ADHD), predominantly inattentive type        Pharmacy    [facility] - [address] [phone], [phone]

## 2025-06-18 DIAGNOSIS — F90.0 ATTENTION DEFICIT HYPERACTIVITY DISORDER (ADHD), PREDOMINANTLY INATTENTIVE TYPE: ICD-10-CM

## 2025-06-20 RX ORDER — GUANFACINE 1 MG/1
2 TABLET ORAL
Qty: 180 TABLET | Refills: 3 | Status: SHIPPED | OUTPATIENT
Start: 2025-06-20

## 2025-06-20 NOTE — TELEPHONE ENCOUNTER
Refill Per Protocol     Requested Prescriptions   Pending Prescriptions Disp Refills    GUANFACINE 1 MG Oral Tab [Pharmacy Med Name: guanFACINE HCL 1MG TAB] 60 tablet 0     Sig: TAKE TWO TABLETS BY MOUTH DAILY IN THE MORNING       Hypertension Medications Protocol Passed - 6/20/2025  7:34 AM        Passed - CMP or BMP in past 12 months        Passed - Last BP reading less than 140/90     BP Readings from Last 1 Encounters:   02/24/25 100/60               Passed - In person appointment or virtual visit in the past 12 mos or appointment in next 3 mos     Recent Outpatient Visits              3 months ago Eczema, unspecified type    [place] [id number], Williams Munoz APRN    Office Visit    7 months ago Routine general medical examination at a health care facility    [place] [id number], Lizeth Bedoya MD    Office Visit    1 year ago DUB (dysfunctional uterine bleeding)    [place] [id number], Lizeth Bedoya MD    Virtual Phone E/M    2 years ago Routine general medical examination at a health care facility    [place] [id number] Lilia, MD    Office Visit    2 years ago Constipation, unspecified constipation type    [facility],  [facility] Muriel Bliss,     Office Visit                      Passed - EGFRCR or GFRAA > 50     GFR Evaluation  EGFRCR: 106 , resulted on 11/4/2024          Passed - Medication is active on med list

## 2025-06-24 NOTE — PATIENT INSTRUCTIONS
Recommend avoidance of fabric softener and use of enzyme free detergents ( eg Dreft or Arm and Hammer) only.   Moisturize head to toe after bathing with Cerave or Cetaphil cream. Use preferably soapless cleansers such as Cetaphil, or fatted soaps such as un right sided rib pain and right wrist pain s/p mechanical fall 2 days ago, denies head strike/LOC

## 2025-07-22 DIAGNOSIS — R21 RASH AND NONSPECIFIC SKIN ERUPTION: ICD-10-CM

## 2025-07-24 RX ORDER — TRIAMCINOLONE ACETONIDE 1 MG/G
OINTMENT TOPICAL
Qty: 454 G | Refills: 0 | Status: SHIPPED | OUTPATIENT
Start: 2025-07-24

## 2025-08-25 DIAGNOSIS — R21 RASH AND NONSPECIFIC SKIN ERUPTION: ICD-10-CM

## 2025-08-29 RX ORDER — TRIAMCINOLONE ACETONIDE 1 MG/G
OINTMENT TOPICAL
Qty: 454 G | Refills: 0 | Status: SHIPPED | OUTPATIENT
Start: 2025-08-29

## (undated) DIAGNOSIS — K59.00 CONSTIPATION, UNSPECIFIED CONSTIPATION TYPE: Primary | ICD-10-CM

## (undated) DIAGNOSIS — R14.0 ABDOMINAL DISTENSION: ICD-10-CM

## (undated) DEVICE — 35 ML SYRINGE REGULAR TIP: Brand: MONOJECT

## (undated) DEVICE — LINE MNTR ADLT SET O2 INTMD

## (undated) DEVICE — Device: Brand: CUSTOM PROCEDURE KIT

## (undated) DEVICE — Device

## (undated) DEVICE — KIT ENDO ORCAPOD 160/180/190

## (undated) DEVICE — MEDI-VAC NON-CONDUCTIVE SUCTION TUBING 6MM X 1.8M (6FT.) L: Brand: CARDINAL HEALTH

## (undated) DEVICE — Device: Brand: DEFENDO AIR/WATER/SUCTION AND BIOPSY VALVE

## (undated) DEVICE — KIT CLEAN ENDOKIT 1.1OZ GOWNX2

## (undated) DEVICE — STERILE LATEX POWDER-FREE SURGICAL GLOVESWITH NITRILE COATING: Brand: PROTEXIS

## (undated) NOTE — MR AVS SNAPSHOT
[facility]  [address] [facility] [address] [phone]               Thank you for choosing us for your health care visit with Evy Cesar MD.  We are glad to serve you and happy to provide you with this summary of your requirements for authorization, please wait 5-7 days and then contact your physician's office. At that time, you will be provided with any authorization numbers or be assured that none are required. You can then schedule your appointment.  Failure to obtain to the skin rather than rough or scratchy materials. · Use mild laundry soap free of scents and perfumes. Make sure to rinse all the soap out of your clothes. · Treat any skin infection as directed. · Use oral diphenhydramine to help reduce itching.  Bri Martinez · An infection caused by a fungus (ringworm), virus (chickenpox), or bacteria (strep)  · Bites or infestation caused by insects or pests, such as ticks, lice, or mites  · Dry skin, which is often seen during the winter months and in older people  How can I · Temperature of more than 101.0°F (38.3°C), or as directed  · Sore throat, a cough, or unusual fatigue  · Red, oozy, or painful rash gets worse. These are signs of infection.   · Rash covers your face, genitals, or most of your body  · Crusty sores or red Take 20 mg by mouth 2 (two) times daily. Indications: Attention Deficit Disorder   Commonly known as:  RITALIN           QUEtiapine Fumarate 200 MG Tabs   Take 200 mg by mouth nightly.    Commonly known as:  SEROQUEL           VYVANSE 40 MG Caps   Generic d

## (undated) NOTE — LETTER
[facility]  Administration of Anesthesia  1. Julian Sánchez, or _________________________________ acting on her behalf, (Patient) (Dependent/Representative) request to receive anesthesia for my pending procedure/operation/treatment. infections, high spinal block, spinal bleeding, seizure, cardiac arrest and death. 7. AWARENESS: I understand that it is possible (but unlikely) to have explicit memory of events from the operating room while under general anesthesia.   8. ELECTROCONVULSIV unconscious pt /Relationship    My signature below affirms that prior to the time of the procedure, I have explained to the patient and/or his/her guardian, the risks and benefits of undergoing anesthesia, as well as any reasonable alternatives.     _______

## (undated) NOTE — ED AVS SNAPSHOT
Edith Treadwell   MRN: [id number]    Department:  [facility] Emergency Department   Date of Visit:  12/3/2019           Disclosure     Insurance plans vary and the physician(s) referred by the ER may not be covered by your plan.  Please contact yo tell this physician (or your personal doctor if your instructions are to return to your personal doctor) about any new or lasting problems. The primary care or specialist physician will see patients referred from the [facility] Emergency Department.  Glynn Jessica

## (undated) NOTE — MR AVS SNAPSHOT
[address] [place] [id number] [phone]               Thank you for choosing us for your health care visit with [facility].   We are glad to serve you and happy to provide you with this summary of * Methylphenidate HCl ER 54 MG Tbcr   Take 54 mg by mouth every morning. Indications: Attention Deficit Hyperactivity Disorder           * Methylphenidate HCl 20 MG Tabs   Take 20 mg by mouth 2 (two) times daily.  Indications: Attention Deficit Disorder

## (undated) NOTE — MR AVS SNAPSHOT
[address] [address]  [phone]               Thank you for choosing us for your health care visit with [facility].   We are glad to serve you and happy to provide you with this summary of your vi Generic drug:  Lisdexamfetamine Dimesylate   Take 40 mg by mouth every morning. * Notice: This list has 4 medication(s) that are the same as other medications prescribed for you.  Read the directions carefully, and ask your doctor or other care p

## (undated) NOTE — MR AVS SNAPSHOT
[facility]  [address] [facility] [id number] [phone]               Thank you for choosing us for your health care visit with Adriano Sutton MD.  We are glad to serve you and happy to provide you with this summary of your Phone:  [phone]   Fax:  [phone]         Referral Orders      Normal Orders This Visit    UROLOGY - INTERNAL [[id number] CUSTOM]  Order #:  [id number]         **REFERRAL REQUEST**    Your physician has referred you to a specialist.  Your physician or Medical Issues Discussed Today     Incontinence of urine    Pharyngitis    Incontinence paradoxical    -  Primary    Vitamin B12 deficiency          Instructions and Information about Your Health      Treating Incontinence in Women: Special Therapies     D substitute for professional medical care. Always follow your healthcare professional's instructions. Allergies as of Apr 04, 2017     Amoxicillin Swelling    On eyes.      Amoxicillin                 Today's Vital Signs     BP Pulse Temp Height Commonly known as:  VYVANSE           * Notice: This list has 8 medication(s) that are the same as other medications prescribed for you. Read the directions carefully, and ask your doctor or other care provider to review them with you.             Results

## (undated) NOTE — ED AVS SNAPSHOT
Edith Binder   MRN: [id number]    Department:  [facility] Emergency Department   Date of Visit:  4/2/2019           Disclosure     Insurance plans vary and the physician(s) referred by the ER may not be covered by your plan.  Please contact CARE PHYSICIAN AT ONCE OR RETURN IMMEDIATELY TO THE EMERGENCY DEPARTMENT. If you have been prescribed any medication(s), please fill your prescription right away and begin taking the medication(s) as directed.   If you believe that any of the medications

## (undated) NOTE — MR AVS SNAPSHOT
[address] [place] [address] [phone]               Thank you for choosing us for your health care visit with [facility].   We are glad to serve you and happy to provide you with this summary of * Methylphenidate HCl ER 54 MG Tbcr   Take 54 mg by mouth every morning. Indications: Attention Deficit Hyperactivity Disorder           * Methylphenidate HCl 20 MG Tabs   Take 20 mg by mouth 2 (two) times daily.  Indications: Attention Deficit Disorder

## (undated) NOTE — MR AVS SNAPSHOT
[facility]  [address] [facility] [id number] [phone]               Thank you for choosing us for your health care visit with Kacy Hampton MD.  We are glad to serve you and happy to provide you with this summary of your both extremes may occur at once. More often, mood shifts back and forth. These mood swings may occur just once in a while. Or they may happen four or more times a year. Without treatment, they will likely recur throughout life.   Manic episodes  During [facility] · Trouble thinking things through  · Low self-esteem  · Depression  · Trouble holding a job  · Memory problems  · Problems with a marriage or relationship  · Lack of discipline   What is ADHD?   Attention deficit hyperactivity disorder makes it hard to focu substitute for professional medical care. Always follow your healthcare professional's instructions. Follow Up with Our Office     Return in about 3 months (around 4/17/2017).       Allergies as of Jan 17, 2017     Amoxicillin Swelling    On eye Vitamin B12 [E]    Complete by:  Jan 17, 2017 (Approximate)    Assoc Dx:  Establishing care with new doctor, encounter for [Z71.89]                 Follow-up Instructions     Return in about 3 months (around 4/17/2017).          Results of Recent Testing

## (undated) NOTE — MR AVS SNAPSHOT
[facility] SPECIALTY [facility] [place] [address] [address] [address]  [phone]               Thank you for choosing us for your health care visit with Fina Pollock MD.  We are glad to serve you and happy to provide you with this summary of y taking this medication, and follow the directions you see here. Commonly known as:  DEPAKOTE           GuanFACINE HCl 1 MG Tabs   Take 1 mg by mouth 2 (two) times daily.    Commonly known as:  TENEX           QUEtiapine Fumarate 200 MG Tabs   Take 200 mg

## (undated) NOTE — ED AVS SNAPSHOT
[facility] Emergency Department    [address]     [place] [address]    Phone:  [phone]    Fax:  [phone]           Blaire Arce   MRN: [id number]    Department:  [facility] Emergency Department   Date of Visit:  4/1/2 and Class Registration line at [phone] or find a doctor online by visiting [email or web address].    IF THERE IS ANY CHANGE OR WORSENING OF YOUR CONDITION, CALL YOUR PRIMARY CARE PHYSICIAN AT ONCE OR RETURN IMMEDIATELY TO [address] [place] [address].     If

## (undated) NOTE — ED AVS SNAPSHOT
[facility] Emergency Department    [address]     [place] [address]    Phone:  [phone]    Fax:  [phone]           Emily Nair   MRN: [id number]    Department:  [facility] Emergency Department   Date of Visit:  4/1/2 SORE THROATS, SELF-CARE FOR (ENGLISH)    FEVER CONTROL (ADULT) (ENGLISH)    FEBRILE ILLNESS, UNCERTAIN CAUSE (ADULT) (ENGLISH)      Disclosure     Insurance plans vary and the physician(s) referred by the ER may not be covered by your plan.  Please contact CARE PHYSICIAN AT ONCE OR RETURN IMMEDIATELY TO THE EMERGENCY DEPARTMENT. If you have been prescribed any medication(s), please fill your prescription right away and begin taking the medication(s) as directed.   If you believe that any of the medications harming yourself, contact [facility] at [phone]. - If you don’t have insurance, Yash Hurtado has partnered with [facility] [address] to help you get signed up for insurance coverage.   [facility] [place]